# Patient Record
Sex: FEMALE | Race: WHITE | NOT HISPANIC OR LATINO | Employment: FULL TIME | URBAN - METROPOLITAN AREA
[De-identification: names, ages, dates, MRNs, and addresses within clinical notes are randomized per-mention and may not be internally consistent; named-entity substitution may affect disease eponyms.]

---

## 2017-05-25 ENCOUNTER — HISTORICAL (OUTPATIENT)
Dept: ADMINISTRATIVE | Facility: HOSPITAL | Age: 37
End: 2017-05-25

## 2017-06-01 ENCOUNTER — HISTORICAL (OUTPATIENT)
Dept: RADIOLOGY | Facility: HOSPITAL | Age: 37
End: 2017-06-01

## 2017-11-06 ENCOUNTER — HISTORICAL (OUTPATIENT)
Dept: LAB | Facility: HOSPITAL | Age: 37
End: 2017-11-06

## 2017-11-06 LAB
ABS NEUT (OLG): 4.93 X10(3)/MCL (ref 2.1–9.2)
ALBUMIN SERPL-MCNC: 3.9 GM/DL (ref 3.4–5)
ALBUMIN/GLOB SERPL: 1.1 {RATIO}
ALP SERPL-CCNC: 64 UNIT/L (ref 38–126)
ALT SERPL-CCNC: 16 UNIT/L (ref 12–78)
APPEARANCE, UA: ABNORMAL
AST SERPL-CCNC: 6 UNIT/L (ref 15–37)
BACTERIA SPEC CULT: ABNORMAL /HPF
BASOPHILS # BLD AUTO: 0 X10(3)/MCL (ref 0–0.2)
BASOPHILS NFR BLD AUTO: 0 %
BILIRUB SERPL-MCNC: 0.4 MG/DL (ref 0.2–1)
BILIRUB UR QL STRIP: NEGATIVE
BILIRUBIN DIRECT+TOT PNL SERPL-MCNC: 0.1 MG/DL (ref 0–0.2)
BILIRUBIN DIRECT+TOT PNL SERPL-MCNC: 0.3 MG/DL (ref 0–0.8)
BUN SERPL-MCNC: 11 MG/DL (ref 7–18)
CALCIUM SERPL-MCNC: 9.1 MG/DL (ref 8.5–10.1)
CHLORIDE SERPL-SCNC: 106 MMOL/L (ref 98–107)
CHOLEST SERPL-MCNC: 183 MG/DL (ref 0–200)
CHOLEST/HDLC SERPL: 3.2 {RATIO} (ref 0–4)
CO2 SERPL-SCNC: 25 MMOL/L (ref 21–32)
COLOR UR: YELLOW
CREAT SERPL-MCNC: 0.63 MG/DL (ref 0.55–1.02)
EOSINOPHIL # BLD AUTO: 0 X10(3)/MCL (ref 0–0.9)
EOSINOPHIL NFR BLD AUTO: 0 %
ERYTHROCYTE [DISTWIDTH] IN BLOOD BY AUTOMATED COUNT: 12.7 % (ref 11.5–17)
GLOBULIN SER-MCNC: 3.6 GM/DL (ref 2.4–3.5)
GLUCOSE (UA): NEGATIVE
GLUCOSE SERPL-MCNC: 90 MG/DL (ref 74–106)
HCT VFR BLD AUTO: 42.1 % (ref 37–47)
HDLC SERPL-MCNC: 58 MG/DL (ref 35–60)
HGB BLD-MCNC: 13.3 GM/DL (ref 12–16)
HGB UR QL STRIP: NEGATIVE
KETONES UR QL STRIP: NEGATIVE
LDLC SERPL CALC-MCNC: 99 MG/DL (ref 0–129)
LEUKOCYTE ESTERASE UR QL STRIP: NEGATIVE
LYMPHOCYTES # BLD AUTO: 2 X10(3)/MCL (ref 0.6–4.6)
LYMPHOCYTES NFR BLD AUTO: 28 %
MCH RBC QN AUTO: 30.2 PG (ref 27–31)
MCHC RBC AUTO-ENTMCNC: 31.6 GM/DL (ref 33–36)
MCV RBC AUTO: 95.5 FL (ref 80–94)
MONOCYTES # BLD AUTO: 0.4 X10(3)/MCL (ref 0.1–1.3)
MONOCYTES NFR BLD AUTO: 5 %
NEUTROPHILS # BLD AUTO: 4.93 X10(3)/MCL (ref 1.4–7.9)
NEUTROPHILS NFR BLD AUTO: 66 %
NITRITE UR QL STRIP: NEGATIVE
PH UR STRIP: 5 [PH] (ref 5–9)
PLATELET # BLD AUTO: 292 X10(3)/MCL (ref 130–400)
PMV BLD AUTO: 9 FL (ref 9.4–12.4)
POTASSIUM SERPL-SCNC: 4.5 MMOL/L (ref 3.5–5.1)
PROT SERPL-MCNC: 7.5 GM/DL (ref 6.4–8.2)
PROT UR QL STRIP: NEGATIVE
RBC # BLD AUTO: 4.41 X10(6)/MCL (ref 4.2–5.4)
RBC #/AREA URNS HPF: ABNORMAL /[HPF]
SODIUM SERPL-SCNC: 140 MMOL/L (ref 136–145)
SP GR UR STRIP: 1.02 (ref 1–1.03)
SQUAMOUS EPITHELIAL, UA: 13 /HPF (ref 0–4)
TRIGL SERPL-MCNC: 131 MG/DL (ref 30–150)
TSH SERPL-ACNC: 2 MIU/ML (ref 0.36–3.74)
UROBILINOGEN UR STRIP-ACNC: 0.2
VLDLC SERPL CALC-MCNC: 26 MG/DL
WBC # SPEC AUTO: 7.4 X10(3)/MCL (ref 4.5–11.5)
WBC #/AREA URNS HPF: ABNORMAL /[HPF]

## 2017-11-08 LAB — FINAL CULTURE: NORMAL

## 2019-07-15 ENCOUNTER — HISTORICAL (OUTPATIENT)
Dept: LAB | Facility: HOSPITAL | Age: 39
End: 2019-07-15

## 2019-07-15 LAB
APPEARANCE, UA: NORMAL
BACTERIA SPEC CULT: NORMAL /HPF
BILIRUB UR QL STRIP: NEGATIVE
COLOR UR: YELLOW
GLUCOSE (UA): NEGATIVE
HGB UR QL STRIP: NEGATIVE
KETONES UR QL STRIP: NEGATIVE
LEUKOCYTE ESTERASE UR QL STRIP: NEGATIVE
NITRITE UR QL STRIP: NEGATIVE
PH UR STRIP: 5 [PH] (ref 5–9)
PROT UR QL STRIP: NEGATIVE
RBC #/AREA URNS HPF: NORMAL /[HPF]
SP GR UR STRIP: 1.02 (ref 1–1.03)
SQUAMOUS EPITHELIAL, UA: NORMAL
UROBILINOGEN UR STRIP-ACNC: 0.2
WBC #/AREA URNS HPF: NORMAL /[HPF]

## 2022-04-10 ENCOUNTER — HISTORICAL (OUTPATIENT)
Dept: ADMINISTRATIVE | Facility: HOSPITAL | Age: 42
End: 2022-04-10

## 2022-04-11 ENCOUNTER — HISTORICAL (OUTPATIENT)
Dept: ADMINISTRATIVE | Facility: HOSPITAL | Age: 42
End: 2022-04-11

## 2022-04-28 VITALS
OXYGEN SATURATION: 99 % | BODY MASS INDEX: 31.28 KG/M2 | DIASTOLIC BLOOD PRESSURE: 90 MMHG | SYSTOLIC BLOOD PRESSURE: 140 MMHG | HEIGHT: 62 IN | WEIGHT: 170 LBS

## 2022-04-28 VITALS
OXYGEN SATURATION: 99 % | HEIGHT: 62 IN | WEIGHT: 170 LBS | SYSTOLIC BLOOD PRESSURE: 140 MMHG | DIASTOLIC BLOOD PRESSURE: 90 MMHG | BODY MASS INDEX: 31.28 KG/M2

## 2022-05-18 RX ORDER — METHOCARBAMOL 750 MG/1
1500 TABLET, FILM COATED ORAL NIGHTLY
COMMUNITY
Start: 2021-11-23 | End: 2022-05-31 | Stop reason: SDUPTHER

## 2022-05-18 RX ORDER — DEXTROAMPHETAMINE SACCHARATE, AMPHETAMINE ASPARTATE MONOHYDRATE, DEXTROAMPHETAMINE SULFATE AND AMPHETAMINE SULFATE 7.5; 7.5; 7.5; 7.5 MG/1; MG/1; MG/1; MG/1
1 CAPSULE, EXTENDED RELEASE ORAL DAILY
COMMUNITY
Start: 2022-04-06 | End: 2022-05-31 | Stop reason: SDUPTHER

## 2022-05-18 NOTE — TELEPHONE ENCOUNTER
----- Message from Chapincito Crane sent at 5/18/2022  1:36 PM CDT -----  Regarding: Med Refill  Type:  RX Refill Request    Who Called: Patient    Refill or New Rx: refill    RX Name and Strength:Adderall , Robaxin    Preferred Pharmacy with phone number: [facility]

## 2022-05-19 RX ORDER — METHOCARBAMOL 750 MG/1
1500 TABLET, FILM COATED ORAL NIGHTLY
Qty: 30 TABLET | Refills: 0 | OUTPATIENT
Start: 2022-05-19

## 2022-05-19 RX ORDER — DEXTROAMPHETAMINE SACCHARATE, AMPHETAMINE ASPARTATE MONOHYDRATE, DEXTROAMPHETAMINE SULFATE AND AMPHETAMINE SULFATE 7.5; 7.5; 7.5; 7.5 MG/1; MG/1; MG/1; MG/1
30 CAPSULE, EXTENDED RELEASE ORAL DAILY
Qty: 30 CAPSULE | Refills: 2 | OUTPATIENT
Start: 2022-05-19

## 2022-05-23 PROBLEM — J30.2 SEASONAL ALLERGIC RHINITIS: Chronic | Status: ACTIVE | Noted: 2022-05-23

## 2022-05-23 PROBLEM — F32.9 MAJOR DEPRESSIVE DISORDER: Chronic | Status: ACTIVE | Noted: 2022-05-23

## 2022-05-23 PROBLEM — K21.9 GASTROESOPHAGEAL REFLUX DISEASE: Chronic | Status: ACTIVE | Noted: 2022-05-23

## 2022-05-23 PROBLEM — J30.2 SEASONAL ALLERGIC RHINITIS: Chronic | Status: RESOLVED | Noted: 2022-05-23 | Resolved: 2022-05-23

## 2022-05-23 PROBLEM — K21.9 GASTROESOPHAGEAL REFLUX DISEASE: Status: ACTIVE | Noted: 2022-05-23

## 2022-05-23 PROBLEM — F90.0 ATTENTION DEFICIT HYPERACTIVITY DISORDER (ADHD), PREDOMINANTLY INATTENTIVE TYPE: Status: ACTIVE | Noted: 2022-05-23

## 2022-05-23 PROBLEM — I73.00 RAYNAUD'S DISEASE: Status: ACTIVE | Noted: 2022-05-23

## 2022-05-23 PROBLEM — I73.00 RAYNAUD'S DISEASE: Chronic | Status: ACTIVE | Noted: 2022-05-23

## 2022-05-23 PROBLEM — E66.9 OBESITY: Status: ACTIVE | Noted: 2022-05-23

## 2022-05-23 PROBLEM — F90.0 ATTENTION DEFICIT HYPERACTIVITY DISORDER (ADHD), PREDOMINANTLY INATTENTIVE TYPE: Chronic | Status: ACTIVE | Noted: 2022-05-23

## 2022-05-23 PROBLEM — F41.1 GENERALIZED ANXIETY DISORDER: Status: ACTIVE | Noted: 2022-05-23

## 2022-05-23 PROBLEM — E66.9 OBESITY: Chronic | Status: ACTIVE | Noted: 2022-05-23

## 2022-05-23 PROBLEM — F41.1 GENERALIZED ANXIETY DISORDER: Chronic | Status: ACTIVE | Noted: 2022-05-23

## 2022-05-23 PROBLEM — J30.2 SEASONAL ALLERGIC RHINITIS: Status: ACTIVE | Noted: 2022-05-23

## 2022-05-23 PROBLEM — F32.9 MAJOR DEPRESSIVE DISORDER: Status: ACTIVE | Noted: 2022-05-23

## 2022-05-31 ENCOUNTER — OFFICE VISIT (OUTPATIENT)
Dept: FAMILY MEDICINE | Facility: CLINIC | Age: 42
End: 2022-05-31

## 2022-05-31 VITALS
RESPIRATION RATE: 18 BRPM | WEIGHT: 173 LBS | BODY MASS INDEX: 32.66 KG/M2 | OXYGEN SATURATION: 99 % | DIASTOLIC BLOOD PRESSURE: 78 MMHG | HEART RATE: 101 BPM | TEMPERATURE: 98 F | SYSTOLIC BLOOD PRESSURE: 117 MMHG | HEIGHT: 61 IN

## 2022-05-31 DIAGNOSIS — F90.0 ATTENTION DEFICIT HYPERACTIVITY DISORDER (ADHD), PREDOMINANTLY INATTENTIVE TYPE: Chronic | ICD-10-CM

## 2022-05-31 DIAGNOSIS — K21.9 GASTROESOPHAGEAL REFLUX DISEASE WITHOUT ESOPHAGITIS: Chronic | ICD-10-CM

## 2022-05-31 DIAGNOSIS — F32.9 MAJOR DEPRESSIVE DISORDER, REMISSION STATUS UNSPECIFIED, UNSPECIFIED WHETHER RECURRENT: Chronic | ICD-10-CM

## 2022-05-31 DIAGNOSIS — N62 LARGE BREASTS: ICD-10-CM

## 2022-05-31 DIAGNOSIS — Z12.31 BREAST CANCER SCREENING BY MAMMOGRAM: ICD-10-CM

## 2022-05-31 DIAGNOSIS — F41.1 GENERALIZED ANXIETY DISORDER: Chronic | ICD-10-CM

## 2022-05-31 DIAGNOSIS — I73.00 RAYNAUD'S DISEASE WITHOUT GANGRENE: Chronic | ICD-10-CM

## 2022-05-31 DIAGNOSIS — Z00.00 WELL ADULT EXAM: Primary | ICD-10-CM

## 2022-05-31 PROCEDURE — 99213 OFFICE O/P EST LOW 20 MIN: CPT | Mod: ,,, | Performed by: NURSE PRACTITIONER

## 2022-05-31 PROCEDURE — 99213 PR OFFICE/OUTPT VISIT, EST, LEVL III, 20-29 MIN: ICD-10-PCS | Mod: ,,, | Performed by: NURSE PRACTITIONER

## 2022-05-31 RX ORDER — ALBUTEROL SULFATE 90 UG/1
2 AEROSOL, METERED RESPIRATORY (INHALATION) EVERY 4 HOURS PRN
COMMUNITY
Start: 2021-10-14 | End: 2024-03-12

## 2022-05-31 RX ORDER — ALPRAZOLAM 0.5 MG/1
0.5 TABLET ORAL 2 TIMES DAILY
COMMUNITY
Start: 2022-04-06 | End: 2022-08-03 | Stop reason: SDUPTHER

## 2022-05-31 RX ORDER — AMLODIPINE BESYLATE 5 MG/1
5 TABLET ORAL DAILY
COMMUNITY
Start: 2021-11-23 | End: 2022-10-11 | Stop reason: SDUPTHER

## 2022-05-31 RX ORDER — DEXTROAMPHETAMINE SACCHARATE, AMPHETAMINE ASPARTATE MONOHYDRATE, DEXTROAMPHETAMINE SULFATE AND AMPHETAMINE SULFATE 7.5; 7.5; 7.5; 7.5 MG/1; MG/1; MG/1; MG/1
30 CAPSULE, EXTENDED RELEASE ORAL DAILY
Qty: 30 CAPSULE | Refills: 0 | Status: SHIPPED | OUTPATIENT
Start: 2022-05-31 | End: 2022-07-05 | Stop reason: SDUPTHER

## 2022-05-31 RX ORDER — METHOCARBAMOL 750 MG/1
750 TABLET, FILM COATED ORAL NIGHTLY PRN
Qty: 30 TABLET | Refills: 1 | Status: SHIPPED | OUTPATIENT
Start: 2022-05-31 | End: 2022-10-11 | Stop reason: SDUPTHER

## 2022-05-31 NOTE — PROGRESS NOTES
Patient ID: [id number]     Chief Complaint: Medication Refill        HPI:     Fadia Wilks is a 41 y.o. female here today for an annual wellness visit. She has not had bloodwork completed prior to visit as ordered but will do so in the near future. Overall she feels well. Tolerating stimulant well without adverse effects particularly denies any issues with insomnia, decreased appetite, palpitations, mood changes, or weight loss. Feels dose is controlling ADD/ADHD symptoms rather well. She is complaining of neck and back pain due to her large breasts. Interested in breast reduction but does not currently have insurance.  No other complaints today.         Past Medical History:  Gastroesophageal reflux disease  Generalized anxiety disorder  Major depressive disorder  Obesity  Raynaud's disease  Seasonal allergic rhinitis     Past Surgical History:   Procedure Laterality Date    BREAST BIOPSY  06/04/2019    HYSTERECTOMY  06/13/2019       Review of patient's allergies indicates:  No Known Allergies    Outpatient Medications Marked as Taking for the 5/31/22 encounter (Office Visit) with MANDY Sheikh   Medication Sig Dispense Refill    albuterol (PROVENTIL/VENTOLIN HFA) 90 mcg/actuation inhaler Inhale 2 puffs into the lungs every 4 (four) hours as needed.      ALPRAZolam (XANAX) 0.5 MG tablet Take 0.5 mg by mouth 2 (two) times daily.      [DISCONTINUED] dextroamphetamine-amphetamine (ADDERALL XR) 30 MG 24 hr capsule Take 1 capsule by mouth once daily.      [DISCONTINUED] methocarbamoL (ROBAXIN) 750 MG Tab Take 1,500 mg by mouth nightly.         Social History     Socioeconomic History    Marital status: Unknown   Tobacco Use    Smoking status: Never Smoker    Smokeless tobacco: Never Used   Substance and Sexual Activity    Alcohol use: Yes     Alcohol/week: 12.0 standard drinks     Types: 12 Cans of beer per week    Drug use: Never    Sexual activity: Yes        Family History   Problem  "Relation Age of Onset    Coronary artery disease Mother     Heart attack Mother 40    Coronary artery disease Father         Patient Care Team:  MANDY Sheikh as PCP - General (Internal Medicine)  Penny Figueroa MD (Obstetrics and Gynecology)       Subjective:     ROS    See HPI for details    Constitutional: Denies Change in appetite. Denies Chills. Denies Fever. Denies Night sweats.  Eye: Denies Blurred vision. Denies Discharge. Denies Eye pain.  ENT: Denies Decreased hearing. Denies Sore throat. Denies Swollen glands.  Respiratory: Denies Cough. Denies Shortness of breath. Denies Shortness of breath with exertion. Denies Wheezing.  Cardiovascular: DeniesChest pain at rest. Denies Chest pain with exertion. Denies Irregular heartbeat. Denies Palpitations. Denies Edema.  Gastrointestinal: Denies Abdominal pain. DeniesDiarrhea. Denies Nausea. Denies Vomiting. Denies Hematemesis or Hematochezia.  Genitourinary: Denies Dysuria. Denies Urinary frequency. Denies Urinary urgency. Denies Blood in urine.  Endocrine: Denies Cold intolerance. Denies Excessive thirst. Denies Heat intolerance. Denies Weight loss. Denies Weight gain.  Musculoskeletal: Denies Painful joints. Denies Weakness.  Integumentary: Denies Rash. Denies Itching. Denies Dry skin.  Neurologic: Denies Dizziness. Denies Fainting. Denies Headache.  Psychiatric: Denies Depression. Denies Anxiety. Denies Suicidal/Homicidal ideations.    All Other ROS: Negative except as stated in HPI.       Objective:     /78 (BP Location: Right arm, Patient Position: Sitting, BP Method: Large (Manual))   Pulse 101   Temp 98 °F (36.7 °C) (Oral)   Resp 18   Ht 5' 1" (1.549 m)   Wt 78.5 kg (173 lb)   SpO2 99%   BMI 32.69 kg/m²     Physical Exam    General: Alert and oriented, No acute distress.  Head: Normocephalic, Atraumatic.  Eye: Pupils are equal, round and reactive to light, Extraocular movements are intact, Sclera non-icteric.  Ears/Nose/Throat: " Normal, Mucosa moist,Clear.  Neck/Thyroid: Supple, Non-tender, No carotid bruit, No palpable thyromegaly or thyroid nodule, No lymphadenopathy, No JVD, Full range of motion.  Respiratory: Clear to auscultation bilaterally; No wheezes, rales or rhonchi, Non-labored respirations, Symmetrical chest wall expansion.  Cardiovascular: Regular rate and rhythm, S1/S2 normal, No murmurs, rubs or gallops.  Gastrointestinal: Soft, Non-tender, Non-distended, Normal bowel sounds, No palpable organomegaly.  Musculoskeletal: Normal range of motion.  Integumentary: Warm, Dry, Intact, No suspicious lesions or rashes. Large indention bilaterally due to bra straps.   Extremities: No clubbing, cyanosis or edema  Neurologic: No focal deficits, Cranial Nerves II-XII are grossly intact, Motor strength normal upper and lower extremities, Sensory exam intact.  Psychiatric: Normal interaction, Coherent speech, Euthymic mood, Appropriate affect       Assessment:       ICD-10-CM ICD-9-CM   1. Well adult exam  Z00.00 V70.0   2. Attention deficit hyperactivity disorder (ADHD), predominantly inattentive type  F90.0 314.00   3. Generalized anxiety disorder  F41.1 300.02   4. Major depressive disorder, remission status unspecified, unspecified whether recurrent  F32.9 296.20   5. Breast cancer screening by mammogram  Z12.31 V76.12   6. Raynaud's disease without gangrene  I73.00 443.0   7. Gastroesophageal reflux disease without esophagitis  K21.9 530.81   8. Large breasts  N62 611.1        Plan:         Health Maintenance Topics with due status: Not Due       Topic Last Completion Date    TETANUS VACCINE 08/09/2017      1. Well adult exam  Cervical Cancer Screening - s/p hysterectomy 2019. No longer indicated.     Breast Cancer Screening - Last Mammogram in 2019 . Overdue for follow up in 1 year. Reordered.     Colon Cancer Screening - No family history. Will initiate at 43.     Eye Exam - Recommend annually.    Dental Exam - Recommend biannual  exams.     Vaccinations -   Immunization History   Administered Date(s) Administered    Influenza - Trivalent - PF (ADULT) 12/23/2014, 10/25/2017, 09/10/2018, 09/24/2019, 10/13/2020, 10/14/2021    MMR 11/09/2017    Tdap 08/09/2017        - CBC Auto Differential; Future  - Comprehensive Metabolic Panel; Future  - Hemoglobin A1C; Future  - Lipid Panel; Future  - TSH; Future  - Urinalysis, Reflex to Urine Culture Urine, Clean Catch; Future    2. Attention deficit hyperactivity disorder (ADHD), predominantly inattentive type  Continue Adderall 30mg ER daily     3. Generalized anxiety disorder  Well controlled. Continue Xanax 0.5mg BID PRN  Practice deep breathing or abdominal breathing exercises when anxiety occurs.  Exercise daily. Get sunlight daily.  Avoid caffeine, alcohol and stimulants.  Practice positive phrases and repeat throughout the day, yoga, lavender scents or Chamomile tea will help anxiety.  Set healthy boundaries, avoid people and conversations that increase stress.  Reports any symptoms of suicidal or homicidal ideations immediately, if clinic is closed go to nearest emergency room.      4. Major depressive disorder, remission status unspecified, unspecified whether recurrent  Well controlled without psychotropic  Read positive daily meditations, avoid negative media, set healthy boundaries.  Exercise daily, keep consistent sleep pattern, eat a healthy diet.  Establish good social support, make changes to reduce stress.  Reports any symptoms of suicidal/homicidal ideations or self harm immediately, if clinic is closed go to nearest emergency room.      5. Breast cancer screening by mammogram  - Mammo Digital Screening Bilat; Future  - Mammo Digital Screening Bilat    6. Raynaud's disease without gangrene  Continue Amlodipine 5mg daily.     7. Gastroesophageal reflux disease without esophagitis  Diet controlled.   Avoid spicy, acidic, fried foods and alcohol.  Eat 2-3 hours before going to  bed.  Avoid tight clothing, chew food thoroughly.  Reduce caffeine intake, avoid soda.      8. Large Breasts  Size 38 H  Continue Robaxin PRN at HS for neck/back pain.  Will refer to surgeon when she obtains insurance.           Medication List with Changes/Refills   Current Medications    ALBUTEROL (PROVENTIL/VENTOLIN HFA) 90 MCG/ACTUATION INHALER    Inhale 2 puffs into the lungs every 4 (four) hours as needed.       Start Date: 10/14/2021End Date: --    ALPRAZOLAM (XANAX) 0.5 MG TABLET    Take 0.5 mg by mouth 2 (two) times daily.       Start Date: 4/6/2022  End Date: --    AMLODIPINE (NORVASC) 5 MG TABLET    Take 5 mg by mouth Daily.       Start Date: 11/23/2021End Date: --   Changed and/or Refilled Medications    Modified Medication Previous Medication    DEXTROAMPHETAMINE-AMPHETAMINE (ADDERALL XR) 30 MG 24 HR CAPSULE dextroamphetamine-amphetamine (ADDERALL XR) 30 MG 24 hr capsule       Take 1 capsule (30 mg total) by mouth once daily.    Take 1 capsule by mouth once daily.       Start Date: 5/31/2022 End Date: --    Start Date: 4/6/2022  End Date: 5/31/2022    METHOCARBAMOL (ROBAXIN) 750 MG TAB methocarbamoL (ROBAXIN) 750 MG Tab       Take 1 tablet (750 mg total) by mouth nightly as needed (neck pain).    Take 1,500 mg by mouth nightly.       Start Date: 5/31/2022 End Date: --    Start Date: 11/23/2021End Date: 5/31/2022          The patient's Health Maintenance was reviewed and the following appears to be due at this time:   Health Maintenance Due   Topic Date Due    Hepatitis C Screening  Never done    COVID-19 Vaccine (1) Never done    HIV Screening  Never done    Mammogram  Never done         Follow up in about 3 months (around 8/31/2022) for Virtual Visit, ADD Follow Up. In addition to their scheduled follow up, the patient has also been instructed to follow up on as needed basis.

## 2022-07-05 RX ORDER — DEXTROAMPHETAMINE SACCHARATE, AMPHETAMINE ASPARTATE MONOHYDRATE, DEXTROAMPHETAMINE SULFATE AND AMPHETAMINE SULFATE 7.5; 7.5; 7.5; 7.5 MG/1; MG/1; MG/1; MG/1
30 CAPSULE, EXTENDED RELEASE ORAL DAILY
Qty: 30 CAPSULE | Refills: 0 | Status: SHIPPED | OUTPATIENT
Start: 2022-07-05 | End: 2022-08-03 | Stop reason: SDUPTHER

## 2022-07-15 ENCOUNTER — TELEPHONE (OUTPATIENT)
Dept: ADMINISTRATIVE | Facility: HOSPITAL | Age: 42
End: 2022-07-15

## 2022-07-15 NOTE — TELEPHONE ENCOUNTER
Type:  RX Refill Request    Who Called: self  Refill or New Rx:refill  RX Name and Strength:xanax 0.5 mg  How is the patient currently taking it? (ex. 1XDay):1xday  Is this a 30 day or 90 day RX:30day  Preferred Pharmacy [facility]  Local or Mail Order:local  Ordering Provider:ann  Would the patient rather a call back or a response via [facility]? Call back  Best Call Back Number:[phone]  Additional Information:

## 2022-08-03 NOTE — TELEPHONE ENCOUNTER
----- Message from Angel Michel sent at 8/3/2022  1:36 PM CDT -----  .Type:  RX Refill Request    Who Called: Fadia  Refill or New Rx: Refill   RX Name and Strength: Adderoll Extended release 30 mg. and xanax .5 mg. The pharmacy has not received anything on the xanax.  How is the patient currently taking it? (ex. 1XDay):Adderoll 1x day and xanax as needed.   Is this a 30 day or 90 day RX:  Preferred Pharmacy with phone number: [facility] [place]   Local or Mail Order: Local  Ordering Provider: Tiffanie  Would the patient rather a call back or a response via [facility]?   Best Call Back Number: [phone]  Additional Information: Please call her back with any questions.

## 2022-08-04 RX ORDER — ALPRAZOLAM 0.5 MG/1
0.5 TABLET ORAL 2 TIMES DAILY
Qty: 60 TABLET | Refills: 0 | Status: SHIPPED | OUTPATIENT
Start: 2022-08-04

## 2022-08-04 RX ORDER — DEXTROAMPHETAMINE SACCHARATE, AMPHETAMINE ASPARTATE MONOHYDRATE, DEXTROAMPHETAMINE SULFATE AND AMPHETAMINE SULFATE 7.5; 7.5; 7.5; 7.5 MG/1; MG/1; MG/1; MG/1
30 CAPSULE, EXTENDED RELEASE ORAL DAILY
Qty: 30 CAPSULE | Refills: 0 | Status: SHIPPED | OUTPATIENT
Start: 2022-08-04 | End: 2022-09-13 | Stop reason: SDUPTHER

## 2022-09-08 ENCOUNTER — TELEPHONE (OUTPATIENT)
Dept: FAMILY MEDICINE | Facility: CLINIC | Age: 42
End: 2022-09-08

## 2022-09-14 RX ORDER — DEXTROAMPHETAMINE SACCHARATE, AMPHETAMINE ASPARTATE MONOHYDRATE, DEXTROAMPHETAMINE SULFATE AND AMPHETAMINE SULFATE 7.5; 7.5; 7.5; 7.5 MG/1; MG/1; MG/1; MG/1
30 CAPSULE, EXTENDED RELEASE ORAL DAILY
Qty: 30 CAPSULE | Refills: 0 | Status: SHIPPED | OUTPATIENT
Start: 2022-09-14 | End: 2022-10-11 | Stop reason: SDUPTHER

## 2022-10-11 ENCOUNTER — OFFICE VISIT (OUTPATIENT)
Dept: FAMILY MEDICINE | Facility: CLINIC | Age: 42
End: 2022-10-11
Payer: COMMERCIAL

## 2022-10-11 VITALS
HEART RATE: 88 BPM | SYSTOLIC BLOOD PRESSURE: 134 MMHG | TEMPERATURE: 98 F | WEIGHT: 166.38 LBS | DIASTOLIC BLOOD PRESSURE: 91 MMHG | BODY MASS INDEX: 31.41 KG/M2 | OXYGEN SATURATION: 99 % | HEIGHT: 61 IN | RESPIRATION RATE: 16 BRPM

## 2022-10-11 DIAGNOSIS — I73.00 RAYNAUD'S DISEASE WITHOUT GANGRENE: Chronic | ICD-10-CM

## 2022-10-11 DIAGNOSIS — M62.838 NECK MUSCLE SPASM: ICD-10-CM

## 2022-10-11 DIAGNOSIS — R39.9 UTI SYMPTOMS: ICD-10-CM

## 2022-10-11 DIAGNOSIS — T78.40XA ALLERGY, INITIAL ENCOUNTER: ICD-10-CM

## 2022-10-11 DIAGNOSIS — F41.1 GENERALIZED ANXIETY DISORDER: Chronic | ICD-10-CM

## 2022-10-11 DIAGNOSIS — Z11.59 ENCOUNTER FOR HEPATITIS C SCREENING TEST FOR LOW RISK PATIENT: ICD-10-CM

## 2022-10-11 DIAGNOSIS — F90.0 ATTENTION DEFICIT HYPERACTIVITY DISORDER (ADHD), PREDOMINANTLY INATTENTIVE TYPE: Primary | Chronic | ICD-10-CM

## 2022-10-11 DIAGNOSIS — Z11.4 ENCOUNTER FOR SCREENING FOR HIV: ICD-10-CM

## 2022-10-11 PROCEDURE — 1159F PR MEDICATION LIST DOCUMENTED IN MEDICAL RECORD: ICD-10-PCS | Mod: CPTII,,, | Performed by: STUDENT IN AN ORGANIZED HEALTH CARE EDUCATION/TRAINING PROGRAM

## 2022-10-11 PROCEDURE — 3008F BODY MASS INDEX DOCD: CPT | Mod: CPTII,,, | Performed by: STUDENT IN AN ORGANIZED HEALTH CARE EDUCATION/TRAINING PROGRAM

## 2022-10-11 PROCEDURE — 99214 PR OFFICE/OUTPT VISIT, EST, LEVL IV, 30-39 MIN: ICD-10-PCS | Mod: ,,, | Performed by: STUDENT IN AN ORGANIZED HEALTH CARE EDUCATION/TRAINING PROGRAM

## 2022-10-11 PROCEDURE — 3075F SYST BP GE 130 - 139MM HG: CPT | Mod: CPTII,,, | Performed by: STUDENT IN AN ORGANIZED HEALTH CARE EDUCATION/TRAINING PROGRAM

## 2022-10-11 PROCEDURE — 3080F PR MOST RECENT DIASTOLIC BLOOD PRESSURE >= 90 MM HG: ICD-10-PCS | Mod: CPTII,,, | Performed by: STUDENT IN AN ORGANIZED HEALTH CARE EDUCATION/TRAINING PROGRAM

## 2022-10-11 PROCEDURE — 1160F PR REVIEW ALL MEDS BY PRESCRIBER/CLIN PHARMACIST DOCUMENTED: ICD-10-PCS | Mod: CPTII,,, | Performed by: STUDENT IN AN ORGANIZED HEALTH CARE EDUCATION/TRAINING PROGRAM

## 2022-10-11 PROCEDURE — 1160F RVW MEDS BY RX/DR IN RCRD: CPT | Mod: CPTII,,, | Performed by: STUDENT IN AN ORGANIZED HEALTH CARE EDUCATION/TRAINING PROGRAM

## 2022-10-11 PROCEDURE — 3075F PR MOST RECENT SYSTOLIC BLOOD PRESS GE 130-139MM HG: ICD-10-PCS | Mod: CPTII,,, | Performed by: STUDENT IN AN ORGANIZED HEALTH CARE EDUCATION/TRAINING PROGRAM

## 2022-10-11 PROCEDURE — 1159F MED LIST DOCD IN RCRD: CPT | Mod: CPTII,,, | Performed by: STUDENT IN AN ORGANIZED HEALTH CARE EDUCATION/TRAINING PROGRAM

## 2022-10-11 PROCEDURE — 3008F PR BODY MASS INDEX (BMI) DOCUMENTED: ICD-10-PCS | Mod: CPTII,,, | Performed by: STUDENT IN AN ORGANIZED HEALTH CARE EDUCATION/TRAINING PROGRAM

## 2022-10-11 PROCEDURE — 3080F DIAST BP >= 90 MM HG: CPT | Mod: CPTII,,, | Performed by: STUDENT IN AN ORGANIZED HEALTH CARE EDUCATION/TRAINING PROGRAM

## 2022-10-11 PROCEDURE — 99214 OFFICE O/P EST MOD 30 MIN: CPT | Mod: ,,, | Performed by: STUDENT IN AN ORGANIZED HEALTH CARE EDUCATION/TRAINING PROGRAM

## 2022-10-11 RX ORDER — LORATADINE 10 MG/1
10 TABLET ORAL DAILY PRN
Qty: 30 TABLET | Refills: 0 | Status: CANCELLED | OUTPATIENT
Start: 2022-10-11

## 2022-10-11 RX ORDER — LORATADINE 10 MG/1
10 TABLET ORAL NIGHTLY PRN
Qty: 90 TABLET | Refills: 3 | Status: SHIPPED | OUTPATIENT
Start: 2022-10-11 | End: 2024-03-12 | Stop reason: SDUPTHER

## 2022-10-11 RX ORDER — AMLODIPINE BESYLATE 5 MG/1
2.5 TABLET ORAL DAILY
Qty: 90 TABLET | Refills: 3 | Status: SHIPPED | OUTPATIENT
Start: 2022-10-11 | End: 2023-05-11 | Stop reason: SDUPTHER

## 2022-10-11 RX ORDER — LORATADINE 10 MG/1
10 TABLET ORAL DAILY PRN
COMMUNITY
End: 2022-10-11 | Stop reason: SDUPTHER

## 2022-10-11 RX ORDER — DEXTROAMPHETAMINE SACCHARATE, AMPHETAMINE ASPARTATE MONOHYDRATE, DEXTROAMPHETAMINE SULFATE AND AMPHETAMINE SULFATE 7.5; 7.5; 7.5; 7.5 MG/1; MG/1; MG/1; MG/1
30 CAPSULE, EXTENDED RELEASE ORAL DAILY
Qty: 30 CAPSULE | Refills: 0 | Status: SHIPPED | OUTPATIENT
Start: 2022-11-13 | End: 2023-01-24

## 2022-10-11 RX ORDER — METHOCARBAMOL 750 MG/1
750 TABLET, FILM COATED ORAL NIGHTLY PRN
Qty: 30 TABLET | Refills: 1 | Status: SHIPPED | OUTPATIENT
Start: 2022-10-11 | End: 2023-02-02 | Stop reason: SDUPTHER

## 2022-10-11 RX ORDER — DEXTROAMPHETAMINE SACCHARATE, AMPHETAMINE ASPARTATE MONOHYDRATE, DEXTROAMPHETAMINE SULFATE AND AMPHETAMINE SULFATE 7.5; 7.5; 7.5; 7.5 MG/1; MG/1; MG/1; MG/1
30 CAPSULE, EXTENDED RELEASE ORAL DAILY
Qty: 30 CAPSULE | Refills: 0 | Status: SHIPPED | OUTPATIENT
Start: 2022-12-12 | End: 2023-01-24

## 2022-10-11 RX ORDER — DEXTROAMPHETAMINE SACCHARATE, AMPHETAMINE ASPARTATE MONOHYDRATE, DEXTROAMPHETAMINE SULFATE AND AMPHETAMINE SULFATE 7.5; 7.5; 7.5; 7.5 MG/1; MG/1; MG/1; MG/1
30 CAPSULE, EXTENDED RELEASE ORAL DAILY
Qty: 30 CAPSULE | Refills: 0 | Status: SHIPPED | OUTPATIENT
Start: 2022-10-14 | End: 2023-01-24

## 2022-10-11 NOTE — PROGRESS NOTES
[facility] Office Visit Note - ADD Follow-up    [unfilled]    Chief Complaint   Anxiety and ADD meds refill       Disclaimer:  This note is prepared using voice recognition software and as such is likely to have errors despite attempts at proofreading. Please contact me for questions.     Acute-   ADD- Well controlled on Adderall 30 mg XR.     The patient presents today for follow-up and reevaluation of current ADD.  The patient reports good efficacy regarding productivity and concentration. The patient reports 100% compliance with the medication regimen. The patient reports no present or intolerable side effects to the medication regimen. The patient reports no change in primary pharmacy since the last documented visit with our office.     Neck muscle spasm-  Robaxin helps  Posture dependent    Anxiety-   On Prn Xanax  Well controlled    Raynaud's-   Was not taking Amlodipine  Feels fingers are cold and blue    Allergies-  On Claritin  And Prn albuterol    Depression-  Resolved  Denies Si/Hi      Review of Systems     GENERAL:   no significant weight changes, no tics, no insomnia  CARDIAC:  no chest pain, no palpations or SOB  ABD: no n/v, no diarrhea  Psych:  anxiety, medication working adequately    Physical Exam     Vitals:    10/11/22 0806   BP: (!) 134/91   Pulse: 88   Resp: 16   Temp: 98.4 °F (36.9 °C)       GENERAL:  NAD, no significant weight loss  CHEST:  CTA bilaterally  CARDIAC:  RRR no murmurs audible  PSYCH: Appropriate mood, affect normal      Assessment and Plan   1. Attention deficit hyperactivity disorder (ADHD), predominantly inattentive type  -     CBC Auto Differential; Future; Expected date: 10/11/2022  -     Comprehensive Metabolic Panel; Future; Expected date: 10/11/2022  -     Lipid Panel; Future; Expected date: 10/11/2022  -     TSH; Future; Expected date: 10/11/2022  -     Hemoglobin A1C; Future; Expected date: 10/11/2022  -     Drug Screen, Urine  -     dextroamphetamine-amphetamine (ADDERALL  XR) 30 MG 24 hr capsule; Take 1 capsule (30 mg total) by mouth once daily.  Dispense: 30 capsule; Refill: 0  -     dextroamphetamine-amphetamine (ADDERALL XR) 30 MG 24 hr capsule; Take 1 capsule (30 mg total) by mouth once daily.  Dispense: 30 capsule; Refill: 0  -     dextroamphetamine-amphetamine (ADDERALL XR) 30 MG 24 hr capsule; Take 1 capsule (30 mg total) by mouth once daily.  Dispense: 30 capsule; Refill: 0      -Regimen is efficacious/no side effects/no change to medication regimen.  -Continue medication regimen no change  -3 prescriptions e-prescribed the patient with appropriate fill dates  - checked in past and as indicated  -Scheduled drug contract signed  -Medication side effect profile discussed at length    Follow up in 3 months or sooner if needed    No follow-ups on file.     Education and counseling done face to face regarding medical conditions and plan. Contact office if new symptoms develop. Should any symptoms ever significantly worsen seek emergency medical attention/go to ER. Follow up at least yearly for wellness or sooner PRN. Nurse to call patient with any results. The patient is receptive, expresses understanding and is agreeable to plan. All questions have been answered.    2. Generalized anxiety disorder  Continue prn Xanax    3. Raynaud's disease without gangrene  Protect the extremity tips from cold  Start  amLODIPine (NORVASC) 5 MG tablet; Take 0.5 tablets (2.5 mg total) by mouth Daily.  Dispense: 90 tablet; Refill: 3  -     Lipid Panel; Future; Expected date: 10/11/2022  -     TSH; Future; Expected date: 10/11/2022  -     Hemoglobin A1C; Future; Expected date: 10/11/2022    4. Neck muscle spasm  -     methocarbamoL (ROBAXIN) 750 MG Tab; Take 1 tablet (750 mg total) by mouth nightly as needed (neck pain).  Dispense: 30 tablet; Refill: 1       - Maintain a right posture while using computer    5. UTI symptoms  -     Urinalysis, Reflex to Urine Culture Urine, Clean Catch; Future;  Expected date: 10/11/2022  - ED precautions provided    6. Encounter for hepatitis C screening test for low risk patient  -     Hepatitis C Antibody; Future; Expected date: 10/11/2022    7. Encounter for screening for HIV  -     HIV 1/2 Ag/Ab (4th Gen); Future; Expected date: 10/11/2022    8. Allergy, initial encounter  -     loratadine (CLARITIN) 10 mg tablet; Take 1 tablet (10 mg total) by mouth nightly as needed for Allergies.  Dispense: 90 tablet; Refill: 3       Amparo Thomson M.D.    Follow up in 3 months

## 2022-10-18 ENCOUNTER — TELEPHONE (OUTPATIENT)
Dept: FAMILY MEDICINE | Facility: CLINIC | Age: 42
End: 2022-10-18
Payer: COMMERCIAL

## 2022-10-18 NOTE — TELEPHONE ENCOUNTER
----- Message from Alia Manzo sent at 10/18/2022  4:16 PM CDT -----  Regarding: call backl  .Type:  Needs Medical Advice    Who Called: pt   Symptoms (please be specific):    How long has patient had these symptoms:    Pharmacy name and phone #:    Would the patient rather a call back or a response via [facility]?   Best Call Back Number: [phone]  Additional Information: insurance said they cannot approve the request to refill her Adderall. There wasn't enough information to tell if the requirements were met. Please f/u. The case ID # [id number]

## 2022-10-18 NOTE — TELEPHONE ENCOUNTER
Spoke with pharmacy, they ahs wrong insurance. Rx is $9.   Spoke to patient, voiced understanding

## 2022-11-09 LAB
ALBUMIN SERPL-MCNC: 4.4 G/DL (ref 3.8–4.8)
ALBUMIN/GLOB SERPL: 1.7 {RATIO} (ref 1.2–2.2)
ALP SERPL-CCNC: 71 IU/L (ref 44–121)
ALT SERPL-CCNC: 10 IU/L (ref 0–32)
APPEARANCE UR: ABNORMAL
AST SERPL-CCNC: 12 IU/L (ref 0–40)
BACTERIA #/AREA URNS HPF: ABNORMAL /[HPF]
BACTERIA UR CULT: ABNORMAL
BACTERIA UR CULT: ABNORMAL
BASOPHILS # BLD AUTO: 0 X10E3/UL (ref 0–0.2)
BASOPHILS NFR BLD AUTO: 0 %
BILIRUB SERPL-MCNC: 0.4 MG/DL (ref 0–1.2)
BILIRUB UR QL STRIP: NEGATIVE
BUN SERPL-MCNC: 9 MG/DL (ref 6–24)
BUN/CREAT SERPL: 12 (ref 9–23)
CALCIUM SERPL-MCNC: 9.3 MG/DL (ref 8.7–10.2)
CHLORIDE SERPL-SCNC: 101 MMOL/L (ref 96–106)
CHOLEST SERPL-MCNC: 209 MG/DL (ref 100–199)
CO2 SERPL-SCNC: 22 MMOL/L (ref 20–29)
COLOR UR: YELLOW
CREAT SERPL-MCNC: 0.76 MG/DL (ref 0.57–1)
CRYSTALS URNS MICRO: ABNORMAL
EOSINOPHIL # BLD AUTO: 0 X10E3/UL (ref 0–0.4)
EOSINOPHIL NFR BLD AUTO: 0 %
EPI CELLS #/AREA URNS HPF: >10 /HPF (ref 0–10)
ERYTHROCYTE [DISTWIDTH] IN BLOOD BY AUTOMATED COUNT: 12.8 % (ref 11.7–15.4)
EST. AVERAGE GLUCOSE BLD GHB EST-MCNC: 97 MG/DL
EST. GFR  (NO RACE VARIABLE): 100 ML/MIN/1.73
GLOBULIN SER CALC-MCNC: 2.6 G/DL (ref 1.5–4.5)
GLUCOSE SERPL-MCNC: 90 MG/DL (ref 70–99)
GLUCOSE UR QL STRIP: NEGATIVE
HBA1C MFR BLD: 5 % (ref 4.8–5.6)
HCT VFR BLD AUTO: 42.5 % (ref 34–46.6)
HCV AB S/CO SERPL IA: <0.1 S/CO RATIO (ref 0–0.9)
HDLC SERPL-MCNC: 70 MG/DL
HGB BLD-MCNC: 14.1 G/DL (ref 11.1–15.9)
HGB UR QL STRIP: NEGATIVE
HIV 1+2 AB+HIV1 P24 AG SERPL QL IA: NON REACTIVE
IMM GRANULOCYTES NFR BLD AUTO: 0 %
KETONES UR QL STRIP: NEGATIVE
LDLC SERPL CALC-MCNC: 114 MG/DL (ref 0–99)
LEUKOCYTE ESTERASE UR QL STRIP: ABNORMAL
LYMPHOCYTES # BLD AUTO: 1.9 X10E3/UL (ref 0.7–3.1)
LYMPHOCYTES NFR BLD AUTO: 27 %
MCH RBC QN AUTO: 31.4 PG (ref 26.6–33)
MCHC RBC AUTO-ENTMCNC: 33.2 G/DL (ref 31.5–35.7)
MCV RBC AUTO: 95 FL (ref 79–97)
MICRO URNS: ABNORMAL
MONOCYTES # BLD AUTO: 0.4 X10E3/UL (ref 0.1–0.9)
MONOCYTES NFR BLD AUTO: 6 %
NEUTROPHILS # BLD AUTO: 4.5 X10E3/UL (ref 1.4–7)
NEUTROPHILS NFR BLD AUTO: 67 %
NITRITE UR QL STRIP: NEGATIVE
OTHER ANTIBIOTIC SUSC ISLT: ABNORMAL
PH UR STRIP: 5.5 [PH] (ref 5–7.5)
PLATELET # BLD AUTO: 316 X10E3/UL (ref 150–450)
POTASSIUM SERPL-SCNC: 3.9 MMOL/L (ref 3.5–5.2)
PROT SERPL-MCNC: 7 G/DL (ref 6–8.5)
PROT UR QL STRIP: NEGATIVE
RBC # BLD AUTO: 4.49 X10E6/UL (ref 3.77–5.28)
RBC #/AREA URNS HPF: ABNORMAL /HPF (ref 0–2)
SODIUM SERPL-SCNC: 137 MMOL/L (ref 134–144)
SP GR UR STRIP: 1.02 (ref 1–1.03)
TRIGL SERPL-MCNC: 142 MG/DL (ref 0–149)
TSH SERPL DL<=0.005 MIU/L-ACNC: 3.02 UIU/ML (ref 0.45–4.5)
URINALYSIS REFLEX: ABNORMAL
UROBILINOGEN UR STRIP-MCNC: 0.2 MG/DL (ref 0.2–1)
VLDLC SERPL CALC-MCNC: 25 MG/DL (ref 5–40)
WBC # BLD AUTO: 6.9 X10E3/UL (ref 3.4–10.8)
WBC #/AREA URNS HPF: ABNORMAL /HPF (ref 0–5)

## 2022-11-14 ENCOUNTER — TELEPHONE (OUTPATIENT)
Dept: PRIMARY CARE CLINIC | Facility: CLINIC | Age: 42
End: 2022-11-14
Payer: COMMERCIAL

## 2022-11-14 DIAGNOSIS — Z12.39 ENCOUNTER FOR SCREENING FOR MALIGNANT NEOPLASM OF BREAST, UNSPECIFIED SCREENING MODALITY: Primary | ICD-10-CM

## 2022-11-14 NOTE — TELEPHONE ENCOUNTER
----- Message from Angel Michel sent at 11/14/2022 12:40 PM CST -----  .Type:  Needs Medical Advice    Who Called: Fadia  Symptoms (please be specific):    How long has patient had these symptoms:    Pharmacy name and phone #:    Would the patient rather a call back or a response via [facility]?   Best Call Back Number: [phone] please give her a call back to confirm this is done.   Additional Information: She needs her mammograph order sent to [facility] in [place].

## 2022-11-15 ENCOUNTER — TELEPHONE (OUTPATIENT)
Dept: FAMILY MEDICINE | Facility: CLINIC | Age: 42
End: 2022-11-15
Payer: COMMERCIAL

## 2022-11-15 RX ORDER — NITROFURANTOIN 25; 75 MG/1; MG/1
100 CAPSULE ORAL 2 TIMES DAILY
Qty: 14 CAPSULE | Refills: 0 | Status: SHIPPED | OUTPATIENT
Start: 2022-11-15 | End: 2022-11-22

## 2022-11-15 NOTE — TELEPHONE ENCOUNTER
----- Message from MANDY Sheikh sent at 11/15/2022  4:21 PM CST -----  Start Macrobid BID x 7 days. Sent to pharmacy on file.  Complete full course of antibiotics.  In the future, please report symptoms at onset to ensure we appropriately treat.   Report any continuing symptoms after antibiotic completion such as nausea/vomiting, low back pain, blood in urine, burning with urination, or fever/body aches/chills.  Drink plenty of water daily. Avoid soda.  Women wipe front to back, urinate after sexual intercourse, and avoid irritating feminine products.  Urinate frequently. Do not hold urine for extended periods of time.    Wear cotton underwear and avoid tight fitting pants.   Use OTC AZO for relief of urinary spasms.    ----- Message -----  From: Celestino Vallejo MA  Sent: 11/15/2022   4:15 PM CST  To: MANDY Sheikh    Pt says she having a lot of Burning and urgency    ----- Message -----  From: MANDY Sheikh  Sent: 11/15/2022   3:59 PM CST  To: [facility] Christensen Staff    Is she symptomatic? We typically do not treat for under 100,000.       ----- Message -----  From: Tarun Becker MD  Sent: 11/15/2022   2:54 PM CST  To: Amparo Thomson MD      ----- Message -----  From: Celestino Vallejo MA  Sent: 11/15/2022   2:12 PM CST  To: MANDY Sheikh    Pt asked if you want to prescribe her anything for the e.coli in her urine    ----- Message -----  From: MANDY Sheikh  Sent: 11/15/2022   2:04 PM CST  To: Alix Christensen Staff    Please inform patient of lab results, which are all within acceptable ranges.     ----- Message -----  From: Kaelyn Pathak MA  Sent: 11/10/2022  12:20 PM CST  To: Amparo Thomson MD    11/6 labs  ----- Message -----  From: Yehuda Lopez  Sent: 11/10/2022  10:28 AM CST  To: Alix Christensen Staff    .Type:  Test Results    Who Called: pt  Name of Test (Lab/Mammo/Etc): Blood test and Urine Analyst   Date of Test: Monday 11/7  Ordering  Provider:   Where the test was performed: [facility]  Would the patient rather a call back or a response via [facility]? Call back  Best Call Back Number: [phone]  Additional Information:

## 2022-11-15 NOTE — TELEPHONE ENCOUNTER
----- Message from MANDY Sheikh sent at 11/15/2022  3:59 PM CST -----  Is she symptomatic? We typically do not treat for under 100,000.       ----- Message -----  From: Tarun Becker MD  Sent: 11/15/2022   2:54 PM CST  To: Amparo Thomson MD      ----- Message -----  From: Celestino Vallejo MA  Sent: 11/15/2022   2:12 PM CST  To: MANDY Sheikh    Pt asked if you want to prescribe her anything for the e.coli in her urine    ----- Message -----  From: MANDY Sheikh  Sent: 11/15/2022   2:04 PM CST  To: Al[facility] Christensen Staff    Please inform patient of lab results, which are all within acceptable ranges.     ----- Message -----  From: Kaelyn Pathak MA  Sent: 11/10/2022  12:20 PM CST  To: Amparo Thomson MD    11/6 labs  ----- Message -----  From: Yehuda Lopez  Sent: 11/10/2022  10:28 AM CST  To: Alix Christensen Staff    .Type:  Test Results    Who Called: pt  Name of Test (Lab/Mammo/Etc): Blood test and Urine Analyst   Date of Test: Monday 11/7  Ordering Provider:   Where the test was performed: [facility]  Would the patient rather a call back or a response via [facility]? Call back  Best Call Back Number: [phone]  Additional Information:

## 2022-11-15 NOTE — TELEPHONE ENCOUNTER
----- Message from MANDY Sheikh sent at 11/15/2022  2:03 PM CST -----  Please inform patient of lab results, which are all within acceptable ranges.     ----- Message -----  From: Kaelyn Pathak MA  Sent: 11/10/2022  12:20 PM CST  To: Amparo Thomson MD    11/6 labs  ----- Message -----  From: Yehuda Lopez  Sent: 11/10/2022  10:28 AM CST  To: Alix Christensen Staff    .Type:  Test Results    Who Called: pt  Name of Test (Lab/Mammo/Etc): Blood test and Urine Analyst   Date of Test: Monday 11/7  Ordering Provider:   Where the test was performed: [facility]  Would the patient rather a call back or a response via [facility]? Call back  Best Call Back Number: [phone]  Additional Information:

## 2022-11-18 ENCOUNTER — TELEPHONE (OUTPATIENT)
Dept: PRIMARY CARE CLINIC | Facility: CLINIC | Age: 42
End: 2022-11-18
Payer: COMMERCIAL

## 2022-11-18 NOTE — TELEPHONE ENCOUNTER
----- Message from Yehuda Lopez sent at 11/18/2022  9:11 AM CST -----    .Type:  Needs Medical Advice    Who Called: pt  Pharmacy name and phone #:    Would the patient rather a call back or a response via [facility]? Call back  Best Call Back Number: [phone]   Additional Information: pt called to discuss prescription with nurse due to a misunderstanding and needing clarification dextroamphetamine-amphetamine (ADDERALL XR) 30 MG 24 hr capsule

## 2022-11-23 DIAGNOSIS — F90.0 ATTENTION DEFICIT HYPERACTIVITY DISORDER (ADHD), PREDOMINANTLY INATTENTIVE TYPE: Chronic | ICD-10-CM

## 2022-11-23 RX ORDER — DEXTROAMPHETAMINE SACCHARATE, AMPHETAMINE ASPARTATE MONOHYDRATE, DEXTROAMPHETAMINE SULFATE AND AMPHETAMINE SULFATE 7.5; 7.5; 7.5; 7.5 MG/1; MG/1; MG/1; MG/1
30 CAPSULE, EXTENDED RELEASE ORAL DAILY
Qty: 30 CAPSULE | Refills: 0 | OUTPATIENT
Start: 2022-12-12

## 2023-01-09 ENCOUNTER — PATIENT MESSAGE (OUTPATIENT)
Dept: FAMILY MEDICINE | Facility: CLINIC | Age: 43
End: 2023-01-09

## 2023-01-24 DIAGNOSIS — F90.0 ATTENTION DEFICIT HYPERACTIVITY DISORDER (ADHD), PREDOMINANTLY INATTENTIVE TYPE: Chronic | ICD-10-CM

## 2023-01-24 RX ORDER — DEXTROAMPHETAMINE SACCHARATE, AMPHETAMINE ASPARTATE MONOHYDRATE, DEXTROAMPHETAMINE SULFATE AND AMPHETAMINE SULFATE 7.5; 7.5; 7.5; 7.5 MG/1; MG/1; MG/1; MG/1
30 CAPSULE, EXTENDED RELEASE ORAL DAILY
Qty: 30 CAPSULE | Refills: 0 | Status: SHIPPED | OUTPATIENT
Start: 2023-01-24 | End: 2023-02-02 | Stop reason: SDUPTHER

## 2023-01-24 NOTE — TELEPHONE ENCOUNTER
----- Message from Kasey José MA sent at 1/24/2023  9:41 AM CST -----  Regarding: refill  .Type:  RX Refill Request    Who Called:  pt    Refill or New Rx: dextroamphetamine-amphetamine (ADDERALL XR) 30 MG 24 hr capsule    How is the patient currently taking it? (ex. 1XDay): 1 day      Preferred Pharmacy with phone number: [facility] in [place]    Local or Mail Order: local    Ordering Provider: edward Murphy Call Back Number: [phone]    Additional Information:  pt has an appt on 1-30-23 she will be out of meds before appt meds is enough to last until Friday and her appt is after

## 2023-02-02 ENCOUNTER — TELEPHONE (OUTPATIENT)
Dept: FAMILY MEDICINE | Facility: CLINIC | Age: 43
End: 2023-02-02

## 2023-02-02 ENCOUNTER — OFFICE VISIT (OUTPATIENT)
Dept: FAMILY MEDICINE | Facility: CLINIC | Age: 43
End: 2023-02-02
Payer: COMMERCIAL

## 2023-02-02 VITALS
HEIGHT: 61 IN | BODY MASS INDEX: 33.23 KG/M2 | SYSTOLIC BLOOD PRESSURE: 120 MMHG | RESPIRATION RATE: 17 BRPM | DIASTOLIC BLOOD PRESSURE: 70 MMHG | HEART RATE: 87 BPM | WEIGHT: 176 LBS | OXYGEN SATURATION: 99 % | TEMPERATURE: 98 F

## 2023-02-02 DIAGNOSIS — M62.838 NECK MUSCLE SPASM: ICD-10-CM

## 2023-02-02 DIAGNOSIS — F90.0 ATTENTION DEFICIT HYPERACTIVITY DISORDER (ADHD), PREDOMINANTLY INATTENTIVE TYPE: Chronic | ICD-10-CM

## 2023-02-02 DIAGNOSIS — Z00.00 WELL ADULT HEALTH CHECK: ICD-10-CM

## 2023-02-02 DIAGNOSIS — N39.0 URINARY TRACT INFECTION WITHOUT HEMATURIA, SITE UNSPECIFIED: ICD-10-CM

## 2023-02-02 DIAGNOSIS — Z12.11 ENCOUNTER FOR SCREENING FOR MALIGNANT NEOPLASM OF COLON: ICD-10-CM

## 2023-02-02 DIAGNOSIS — Z00.00 WELLNESS EXAMINATION: Primary | ICD-10-CM

## 2023-02-02 LAB
AMPHET UR QL SCN: POSITIVE
APPEARANCE UR: ABNORMAL
BACTERIA #/AREA URNS AUTO: ABNORMAL /HPF
BARBITURATE SCN PRESENT UR: NEGATIVE
BENZODIAZ UR QL SCN: POSITIVE
BILIRUB UR QL STRIP.AUTO: NEGATIVE MG/DL
CANNABINOIDS UR QL SCN: POSITIVE
COCAINE UR QL SCN: NEGATIVE
COLOR UR AUTO: YELLOW
FENTANYL UR QL SCN: NEGATIVE
GLUCOSE UR QL STRIP.AUTO: NEGATIVE MG/DL
KETONES UR QL STRIP.AUTO: ABNORMAL MG/DL
LEUKOCYTE ESTERASE UR QL STRIP.AUTO: NEGATIVE UNIT/L
MDMA UR QL SCN: NEGATIVE
NITRITE UR QL STRIP.AUTO: NEGATIVE
OPIATES UR QL SCN: NEGATIVE
PCP UR QL: NEGATIVE
PH UR STRIP.AUTO: 6 [PH]
PH UR: 6 [PH] (ref 3–11)
PROT UR QL STRIP.AUTO: NEGATIVE MG/DL
RBC #/AREA URNS AUTO: <5 /HPF
RBC UR QL AUTO: NEGATIVE UNIT/L
SP GR UR STRIP.AUTO: 1.03 (ref 1–1.03)
SPECIFIC GRAVITY, URINE AUTO (.000) (OHS): 1.03 (ref 1–1.03)
SQUAMOUS #/AREA URNS AUTO: <5 /HPF
UROBILINOGEN UR STRIP-ACNC: 1 MG/DL
WBC #/AREA URNS AUTO: 6 /HPF

## 2023-02-02 PROCEDURE — 3008F BODY MASS INDEX DOCD: CPT | Mod: CPTII,,, | Performed by: STUDENT IN AN ORGANIZED HEALTH CARE EDUCATION/TRAINING PROGRAM

## 2023-02-02 PROCEDURE — 99214 PR OFFICE/OUTPT VISIT, EST, LEVL IV, 30-39 MIN: ICD-10-PCS | Mod: ,,, | Performed by: STUDENT IN AN ORGANIZED HEALTH CARE EDUCATION/TRAINING PROGRAM

## 2023-02-02 PROCEDURE — 99396 PR PREVENTIVE VISIT,EST,40-64: ICD-10-PCS | Mod: 25,,, | Performed by: STUDENT IN AN ORGANIZED HEALTH CARE EDUCATION/TRAINING PROGRAM

## 2023-02-02 PROCEDURE — 1159F PR MEDICATION LIST DOCUMENTED IN MEDICAL RECORD: ICD-10-PCS | Mod: CPTII,,, | Performed by: STUDENT IN AN ORGANIZED HEALTH CARE EDUCATION/TRAINING PROGRAM

## 2023-02-02 PROCEDURE — 3078F DIAST BP <80 MM HG: CPT | Mod: CPTII,,, | Performed by: STUDENT IN AN ORGANIZED HEALTH CARE EDUCATION/TRAINING PROGRAM

## 2023-02-02 PROCEDURE — 3078F PR MOST RECENT DIASTOLIC BLOOD PRESSURE < 80 MM HG: ICD-10-PCS | Mod: CPTII,,, | Performed by: STUDENT IN AN ORGANIZED HEALTH CARE EDUCATION/TRAINING PROGRAM

## 2023-02-02 PROCEDURE — 1160F PR REVIEW ALL MEDS BY PRESCRIBER/CLIN PHARMACIST DOCUMENTED: ICD-10-PCS | Mod: CPTII,,, | Performed by: STUDENT IN AN ORGANIZED HEALTH CARE EDUCATION/TRAINING PROGRAM

## 2023-02-02 PROCEDURE — 3074F SYST BP LT 130 MM HG: CPT | Mod: CPTII,,, | Performed by: STUDENT IN AN ORGANIZED HEALTH CARE EDUCATION/TRAINING PROGRAM

## 2023-02-02 PROCEDURE — 1159F MED LIST DOCD IN RCRD: CPT | Mod: CPTII,,, | Performed by: STUDENT IN AN ORGANIZED HEALTH CARE EDUCATION/TRAINING PROGRAM

## 2023-02-02 PROCEDURE — 99396 PREV VISIT EST AGE 40-64: CPT | Mod: 25,,, | Performed by: STUDENT IN AN ORGANIZED HEALTH CARE EDUCATION/TRAINING PROGRAM

## 2023-02-02 PROCEDURE — 1160F RVW MEDS BY RX/DR IN RCRD: CPT | Mod: CPTII,,, | Performed by: STUDENT IN AN ORGANIZED HEALTH CARE EDUCATION/TRAINING PROGRAM

## 2023-02-02 PROCEDURE — 99214 OFFICE O/P EST MOD 30 MIN: CPT | Mod: ,,, | Performed by: STUDENT IN AN ORGANIZED HEALTH CARE EDUCATION/TRAINING PROGRAM

## 2023-02-02 PROCEDURE — 3074F PR MOST RECENT SYSTOLIC BLOOD PRESSURE < 130 MM HG: ICD-10-PCS | Mod: CPTII,,, | Performed by: STUDENT IN AN ORGANIZED HEALTH CARE EDUCATION/TRAINING PROGRAM

## 2023-02-02 PROCEDURE — 3008F PR BODY MASS INDEX (BMI) DOCUMENTED: ICD-10-PCS | Mod: CPTII,,, | Performed by: STUDENT IN AN ORGANIZED HEALTH CARE EDUCATION/TRAINING PROGRAM

## 2023-02-02 RX ORDER — DEXTROAMPHETAMINE SACCHARATE, AMPHETAMINE ASPARTATE MONOHYDRATE, DEXTROAMPHETAMINE SULFATE AND AMPHETAMINE SULFATE 7.5; 7.5; 7.5; 7.5 MG/1; MG/1; MG/1; MG/1
30 CAPSULE, EXTENDED RELEASE ORAL EVERY MORNING
Qty: 30 CAPSULE | Refills: 0 | Status: SHIPPED | OUTPATIENT
Start: 2023-02-02 | End: 2023-03-06 | Stop reason: SDUPTHER

## 2023-02-02 RX ORDER — SULFAMETHOXAZOLE AND TRIMETHOPRIM 800; 160 MG/1; MG/1
1 TABLET ORAL 2 TIMES DAILY
Qty: 14 TABLET | Refills: 0 | Status: SHIPPED | OUTPATIENT
Start: 2023-02-02 | End: 2023-02-09

## 2023-02-02 RX ORDER — METHOCARBAMOL 500 MG/1
500 TABLET, FILM COATED ORAL 4 TIMES DAILY
Qty: 40 TABLET | Refills: 0 | Status: SHIPPED | OUTPATIENT
Start: 2023-02-02 | End: 2023-02-12

## 2023-02-02 NOTE — PROGRESS NOTES
Patient ID: [id number]     Chief Complaint: Annual Exam        HPI:      Disclaimer:  This note is prepared using voice recognition software and as such is likely to have errors despite attempts at proofreading. Please contact me for questions.    Fadia Wilks is a 42 y.o. female here today for an annual wellness visit.      Acute Issues-  UTI Sx-  Dysuria and Frequency  Denies Supra pubic and Back pain    Chronic     Attention deficit hyperactivity disorder (ADHD), predominantly   Well controlled on Adderall 30 mg XR.  The patient presents today for follow-up and reevaluation of current ADD.  The patient reports good efficacy regarding productivity and concentration. The patient reports 100% compliance with the medication regimen. The patient reports no present or intolerable side effects to the medication regimen. The patient reports no change in primary pharmacy since the last documented visit with our office.   Neck muscle spasm-  Robaxin helps  Posture dependent     Anxiety-   On Prn Xanax  Well controlled     Raynaud's-   Was not taking Amlodipine  Feels fingers are cold and blue     Allergies-  On Claritin  And Prn albuterol     Depression-  Resolved  Denies Si/Hi    Past Surgical History:   Procedure Laterality Date    BREAST BIOPSY  06/04/2019    HYSTERECTOMY  06/13/2019       Review of patient's allergies indicates:  No Known Allergies    Outpatient Medications Marked as Taking for the 2/2/23 encounter (Office Visit) with Amparo Thomson MD   Medication Sig Dispense Refill    ALPRAZolam (XANAX) 0.5 MG tablet Take 1 tablet (0.5 mg total) by mouth 2 (two) times daily. 60 tablet 0    dextroamphetamine-amphetamine (ADDERALL XR) 30 MG 24 hr capsule Take 1 capsule (30 mg total) by mouth once daily. 30 capsule 0    loratadine (CLARITIN) 10 mg tablet Take 1 tablet (10 mg total) by mouth nightly as needed for Allergies. 90 tablet 3    methocarbamoL (ROBAXIN) 750 MG Tab Take 1 tablet (750 mg total) by mouth  nightly as needed (neck pain). 30 tablet 1       Social History     Socioeconomic History    Marital status: Unknown   Tobacco Use    Smoking status: Never     Passive exposure: Never    Smokeless tobacco: Never   Substance and Sexual Activity    Alcohol use: Yes     Alcohol/week: 10.0 standard drinks     Types: 10 Glasses of wine per week     Comment: Socially or to unwind    Drug use: Never    Sexual activity: Yes     Partners: Male     Birth control/protection: See Surgical Hx        Family History   Problem Relation Age of Onset    Coronary artery disease Mother     Heart attack Mother 40    COPD Mother     Depression Mother     Diabetes Mother     Heart disease Mother     Hyperlipidemia Mother     Mental illness Mother         Bipolar    Coronary artery disease Father     COPD Father     Heart disease Father     Hyperlipidemia Father     Hypertension Father     Kidney disease Father     Cancer Maternal Aunt     Depression Brother     Mental illness Brother     Depression Sister     Depression Brother     Diabetes Brother     Early death Brother         Covid age 45    Diabetes Brother     Drug abuse Sister         Patient Care Team:  Amparo Thomson MD as PCP - General (Family Medicine)  Amparo Thomson MD as PCP - Family Medicine (Family Medicine)  Penny Figueroa MD (Obstetrics and Gynecology)       Subjective:     ROS    See HPI for details    Constitutional: Denies Change in appetite. Denies Chills. Denies Fever. Denies Night sweats.  Eye: Denies Blurred vision. Denies Discharge. Denies Eye pain.  ENT: Denies Decreased hearing. Denies Sore throat. Denies Swollen glands.  Respiratory: Denies Cough. Denies Shortness of breath. Denies Shortness of breath with exertion. Denies Wheezing.  Cardiovascular: DeniesChest pain at rest. Denies Chest pain with exertion. Denies Irregular heartbeat. Denies Palpitations. Denies Edema.  Gastrointestinal: Denies Abdominal pain. DeniesDiarrhea. Denies Nausea. Denies  "Vomiting. Denies Hematemesis or Hematochezia.  Genitourinary: Denies Dysuria. Denies Urinary frequency. Denies Urinary urgency. Denies Blood in urine.  Endocrine: Denies Cold intolerance. Denies Excessive thirst. Denies Heat intolerance. Denies Weight loss. Denies Weight gain.  Musculoskeletal: Denies Painful joints. Denies Weakness.  Integumentary: Denies Rash. Denies Itching. Denies Dry skin.  Neurologic: Denies Dizziness. Denies Fainting. Denies Headache.  Psychiatric: Denies Depression. Denies Anxiety. Denies Suicidal/Homicidal ideations.    All Other ROS: Negative except as stated in HPI.       Objective:     /70 (BP Location: Right arm, Patient Position: Sitting, BP Method: Large (Manual))   Pulse 87   Temp 98.2 °F (36.8 °C) (Oral)   Resp 17   Ht 5' 1" (1.549 m)   Wt (!) 176.3 kg (388 lb 10.7 oz)   SpO2 99%   BMI 73.44 kg/m²     Physical Exam    General: Alert and oriented, No acute distress.  Head: Normocephalic, Atraumatic.  Eye: Pupils are equal, round and reactive to light, Extraocular movements are intact, Sclera non-icteric.  Ears/Nose/Throat: Normal, Mucosa moist,Clear.  Neck/Thyroid: Supple, Non-tender, No carotid bruit, No palpable thyromegaly or thyroid nodule, No lymphadenopathy, No JVD, Full range of motion.  Respiratory: Clear to auscultation bilaterally; No wheezes, rales or rhonchi, Non-labored respirations, Symmetrical chest wall expansion.  Cardiovascular: Regular rate and rhythm, S1/S2 normal, No murmurs, rubs or gallops.  Gastrointestinal: Soft, Non-tender, Non-distended, Normal bowel sounds, No palpable organomegaly.  Musculoskeletal: Normal range of motion.  Integumentary: Warm, Dry, Intact, No suspicious lesions or rashes.  Extremities: No clubbing, cyanosis or edema  Neurologic: No focal deficits, Cranial Nerves II-XII are grossly intact, Motor strength normal upper and lower extremities, Sensory exam intact.  Psychiatric: Normal interaction, Coherent speech, Euthymic mood, " Appropriate affect       Assessment:       ICD-10-CM ICD-9-CM   1. Wellness examination  Z00.00 V70.0   2. Attention deficit disorder, unspecified hyperactivity presence  F98.8 314.00   3. Urinary tract infection without hematuria, site unspecified  N39.0 599.0   4. Neck muscle spasm  M62.838 728.85   5. Well adult health check  Z00.00 V70.0        Plan:         Health Maintenance Topics with due status: Not Due       Topic Last Completion Date    TETANUS VACCINE 08/09/2017    Hemoglobin A1c (Diabetic Prevention Screening) 11/07/2022        Vaccinations -   Immunization History   Administered Date(s) Administered    Influenza - Trivalent - PF (ADULT) 12/23/2014, 10/25/2017, 09/10/2018, 09/24/2019, 10/13/2020, 10/14/2021    MMR 11/09/2017    Tdap 08/09/2017     1. Wellness examination  Cervical Cancer Screening - Last Pap in 2019. S/p total hysterectomy Dr. James  Breast Cancer Screening - Last Mammogram 1 month ago at [facility] will need the records. Will need records From [facility] office  Colon Cancer Screening - Colonoscopy on to be done after 45 years. The orders are in the computer for colo guard after 45  Eye Exam - Recommend annually.  Dental Exam - Recommend biannual exams.     2. Urinary tract infection without hematuria, site unspecified  - Urine culture; Future  - Urinalysis  - sulfamethoxazole-trimethoprim 800-160mg (BACTRIM DS) 800-160 mg Tab; Take 1 tablet by mouth 2 (two) times daily. for 7 days  Dispense: 14 tablet; Refill: 0    3. Attention deficit hyperactivity disorder (ADHD), predominantly inattentive type  Need to complete UDS and Ekg before the next visit  - Drug Screen, Urine  - dextroamphetamine-amphetamine (ADDERALL XR) 30 MG 24 hr capsule; Take 1 capsule (30 mg total) by mouth every morning.  Dispense: 30 capsule; Refill: 0    4. Neck muscle spasm  - methocarbamoL (ROBAXIN) 500 MG Tab; Take 1 tablet (500 mg total) by mouth 4 (four) times daily. for 10 days  Dispense: 40 tablet; Refill: 0    5.  Well adult health check  - CBC Auto Differential; Future  - Comprehensive Metabolic Panel; Future  - Lipid Panel; Future  - TSH; Future  - Urinalysis; Future  - HIV 1/2 Ag/Ab (4th Gen); Future  - Urinalysis    6. Encounter for screening for malignant neoplasm of colon  - Cologuard Screening (Multitarget Stool DNA); Future  - Cologuard Screening (Multitarget Stool DNA)        Medication List with Changes/Refills   Current Medications    ALBUTEROL (PROVENTIL/VENTOLIN HFA) 90 MCG/ACTUATION INHALER    Inhale 2 puffs into the lungs every 4 (four) hours as needed.       Start Date: 10/14/2021End Date: --    ALPRAZOLAM (XANAX) 0.5 MG TABLET    Take 1 tablet (0.5 mg total) by mouth 2 (two) times daily.       Start Date: 8/4/2022  End Date: --    AMLODIPINE (NORVASC) 5 MG TABLET    Take 0.5 tablets (2.5 mg total) by mouth Daily.       Start Date: 10/11/2022End Date: --    DEXTROAMPHETAMINE-AMPHETAMINE (ADDERALL XR) 30 MG 24 HR CAPSULE    Take 1 capsule (30 mg total) by mouth once daily.       Start Date: 1/24/2023 End Date: --    LORATADINE (CLARITIN) 10 MG TABLET    Take 1 tablet (10 mg total) by mouth nightly as needed for Allergies.       Start Date: 10/11/2022End Date: --    METHOCARBAMOL (ROBAXIN) 750 MG TAB    Take 1 tablet (750 mg total) by mouth nightly as needed (neck pain).       Start Date: 10/11/2022End Date: --          The patient's Health Maintenance was reviewed and the following appears to be due at this time:   Health Maintenance Due   Topic Date Due    COVID-19 Vaccine (1) Never done    Mammogram  Never done    Influenza Vaccine (1) 09/01/2022         Follow up for ADD Follow Up videovisit. 1 year annual . In addition to their scheduled follow up, the patient has also been instructed to follow up on as needed basis.

## 2023-02-02 NOTE — TELEPHONE ENCOUNTER
----- Message from [facility] sent at 2/2/2023 11:43 AM CST -----  Regarding: weight wrong  .Type:  Needs Medical Advice    Who Called:  pt    Best Call Back Number:  [phone]    Additional Information:  she just left the clinic and she noticed her weight was marked wrong they put 388lbs and it should 176.2lbs she also left her urine in the window

## 2023-02-03 NOTE — PROGRESS NOTES
Please inform patient of lab results.      UTI  Continue hydration and Abx as prescribed. Waiing Cx and sensitivity      UDS-   Positive   Amphetamines, Urine- because of Adderall  Benzodiazepine- because of Xanax  But also Positive for Cannabinoids- Please avoid mrijauna if taking         Thanks,    Dr. Thomson

## 2023-02-06 ENCOUNTER — DOCUMENTATION ONLY (OUTPATIENT)
Dept: FAMILY MEDICINE | Facility: CLINIC | Age: 43
End: 2023-02-06
Payer: COMMERCIAL

## 2023-02-06 ENCOUNTER — TELEPHONE (OUTPATIENT)
Dept: FAMILY MEDICINE | Facility: CLINIC | Age: 43
End: 2023-02-06
Payer: COMMERCIAL

## 2023-02-06 LAB — BCS RECOMMENDATION EXT: NORMAL

## 2023-02-06 NOTE — TELEPHONE ENCOUNTER
----- Message from Amparo Thomson MD sent at 2/3/2023 12:31 PM CST -----  Please inform patient of lab results.      UTI  Continue hydration and Abx as prescribed. Waiing Cx and sensitivity      UDS-   Positive   Amphetamines, Urine- because of Adderall  Benzodiazepine- because of Xanax  But also Positive for Cannabinoids- Please avoid mrijauna if taking         Thanks,    Dr. Thomson

## 2023-02-06 NOTE — TELEPHONE ENCOUNTER
PA for adderall has been denied. Pt would to know what else she can take.   Please advise. Thanks.

## 2023-02-07 ENCOUNTER — TELEPHONE (OUTPATIENT)
Dept: FAMILY MEDICINE | Facility: CLINIC | Age: 43
End: 2023-02-07
Payer: COMMERCIAL

## 2023-02-07 NOTE — TELEPHONE ENCOUNTER
----- Message from Amparo Thomson MD sent at 2/6/2023  5:22 PM CST -----  Normal MMG. Repeat in 1 year.

## 2023-02-09 ENCOUNTER — TELEPHONE (OUTPATIENT)
Dept: FAMILY MEDICINE | Facility: CLINIC | Age: 43
End: 2023-02-09
Payer: COMMERCIAL

## 2023-02-09 NOTE — TELEPHONE ENCOUNTER
NA, Detailed message left explaining that when records authorization for papsmear record (per Dr. Thomson request) was faxed, fax was sent back saying facility did not see the patient. Let her know to return call back to clinic and let us know at her earliest convenience.

## 2023-02-13 ENCOUNTER — OFFICE VISIT (OUTPATIENT)
Dept: FAMILY MEDICINE | Facility: CLINIC | Age: 43
End: 2023-02-13
Payer: COMMERCIAL

## 2023-02-13 ENCOUNTER — PATIENT MESSAGE (OUTPATIENT)
Dept: FAMILY MEDICINE | Facility: CLINIC | Age: 43
End: 2023-02-13

## 2023-02-13 VITALS — BODY MASS INDEX: 33.23 KG/M2 | HEIGHT: 61 IN | WEIGHT: 176 LBS

## 2023-02-13 DIAGNOSIS — R68.89 FLU-LIKE SYMPTOMS: ICD-10-CM

## 2023-02-13 DIAGNOSIS — H66.90 OTITIS MEDIA, UNSPECIFIED LATERALITY, UNSPECIFIED OTITIS MEDIA TYPE: Primary | ICD-10-CM

## 2023-02-13 DIAGNOSIS — T78.40XA ALLERGY, INITIAL ENCOUNTER: ICD-10-CM

## 2023-02-13 PROCEDURE — 3008F PR BODY MASS INDEX (BMI) DOCUMENTED: ICD-10-PCS | Mod: CPTII,95,, | Performed by: STUDENT IN AN ORGANIZED HEALTH CARE EDUCATION/TRAINING PROGRAM

## 2023-02-13 PROCEDURE — 99214 OFFICE O/P EST MOD 30 MIN: CPT | Mod: 95,,, | Performed by: STUDENT IN AN ORGANIZED HEALTH CARE EDUCATION/TRAINING PROGRAM

## 2023-02-13 PROCEDURE — 1160F RVW MEDS BY RX/DR IN RCRD: CPT | Mod: CPTII,95,, | Performed by: STUDENT IN AN ORGANIZED HEALTH CARE EDUCATION/TRAINING PROGRAM

## 2023-02-13 PROCEDURE — 3008F BODY MASS INDEX DOCD: CPT | Mod: CPTII,95,, | Performed by: STUDENT IN AN ORGANIZED HEALTH CARE EDUCATION/TRAINING PROGRAM

## 2023-02-13 PROCEDURE — 99214 PR OFFICE/OUTPT VISIT, EST, LEVL IV, 30-39 MIN: ICD-10-PCS | Mod: 95,,, | Performed by: STUDENT IN AN ORGANIZED HEALTH CARE EDUCATION/TRAINING PROGRAM

## 2023-02-13 PROCEDURE — 1159F PR MEDICATION LIST DOCUMENTED IN MEDICAL RECORD: ICD-10-PCS | Mod: CPTII,95,, | Performed by: STUDENT IN AN ORGANIZED HEALTH CARE EDUCATION/TRAINING PROGRAM

## 2023-02-13 PROCEDURE — 1160F PR REVIEW ALL MEDS BY PRESCRIBER/CLIN PHARMACIST DOCUMENTED: ICD-10-PCS | Mod: CPTII,95,, | Performed by: STUDENT IN AN ORGANIZED HEALTH CARE EDUCATION/TRAINING PROGRAM

## 2023-02-13 PROCEDURE — 1159F MED LIST DOCD IN RCRD: CPT | Mod: CPTII,95,, | Performed by: STUDENT IN AN ORGANIZED HEALTH CARE EDUCATION/TRAINING PROGRAM

## 2023-02-13 RX ORDER — AMOXICILLIN AND CLAVULANATE POTASSIUM 875; 125 MG/1; MG/1
1 TABLET, FILM COATED ORAL EVERY 12 HOURS
Qty: 14 TABLET | Refills: 0 | Status: SHIPPED | OUTPATIENT
Start: 2023-02-13 | End: 2023-02-20

## 2023-02-13 RX ORDER — FLUTICASONE PROPIONATE 50 MCG
2 SPRAY, SUSPENSION (ML) NASAL DAILY
Qty: 16 G | Refills: 2 | Status: SHIPPED | OUTPATIENT
Start: 2023-02-13 | End: 2023-03-15

## 2023-02-13 RX ORDER — CETIRIZINE HYDROCHLORIDE 10 MG/1
10 TABLET ORAL DAILY
Qty: 30 TABLET | Refills: 0 | Status: SHIPPED | OUTPATIENT
Start: 2023-02-13 | End: 2024-01-16 | Stop reason: SDUPTHER

## 2023-02-13 NOTE — LETTER
Date: 2023    Patient Name: Fadia Wilks   : 1980  MRN: [id number]   Patient Address: [address] [place] [address]      To Whom It May Concern:    Please excuse Ms. Tom Wilks from work from 2023 to  02/15/2023 she is experiencing low grade  fever, flu-like symptoms the possible urine infection.     Please feel free to contact me if you have any questions or need any additional information.     Sincerely,     Amparo Thomson M.D.  [phone]

## 2023-02-13 NOTE — PROGRESS NOTES
Patient ID: [id number]     Chief Complaint: Cough and Sinus Problem        HPI:   This is a telemedicine note. Patient was treated using telemedicine, real time audio and video, according to [facility] protocols. I, Amparo Thomson MD, conducted the visit from the [facility]. The patient participated in the visit at a non-[facility] location selected by the patient, identified below. I am licensed in the state where the patient stated they are located. The patient stated that they understood and accepted the privacy and security risks to their information at their location. This visit is not recorded.    Patient was located at the patient's home    Disclaimer:  This note is prepared using voice recognition software and as such is likely to have errors despite attempts at proofreading. Please contact me for questions.       Fadia Wilks is a 42 y.o. female or the following     Acute Issues-  Flu-like symptoms-  Patient reports that she has been having low-grade fever along with night sweats since couple of days.  Her nose is congested.  She has been having postnasal drip associated with sneezing.  It is associated with cough and nonproductive sputum.  She also has bilateral ear pains and feels full.  Denies of any headaches, nausea, vomiting, loss in appetite or weight, diarrhea, shortness of breath, chest pain or dizziness.    Chronic Issues-    ----------------------------  Attention deficit hyperactivity disorder (ADHD), predominantly   inattentive type  Gastroesophageal reflux disease  Generalized anxiety disorder  Major depressive disorder  Obesity  Raynaud's disease  Seasonal allergic rhinitis     Past Surgical History:   Procedure Laterality Date    BREAST BIOPSY  06/04/2019    HYSTERECTOMY  06/13/2019       Review of patient's allergies indicates:  No Known Allergies    Outpatient Medications Marked as Taking for the 2/13/23 encounter (Office Visit) with Amparo Thomson MD   Medication Sig  Dispense Refill    albuterol (PROVENTIL/VENTOLIN HFA) 90 mcg/actuation inhaler Inhale 2 puffs into the lungs every 4 (four) hours as needed.      ALPRAZolam (XANAX) 0.5 MG tablet Take 1 tablet (0.5 mg total) by mouth 2 (two) times daily. 60 tablet 0    amLODIPine (NORVASC) 5 MG tablet Take 0.5 tablets (2.5 mg total) by mouth Daily. 90 tablet 3    dextroamphetamine-amphetamine (ADDERALL XR) 30 MG 24 hr capsule Take 1 capsule (30 mg total) by mouth every morning. 30 capsule 0    loratadine (CLARITIN) 10 mg tablet Take 1 tablet (10 mg total) by mouth nightly as needed for Allergies. 90 tablet 3       Social History     Socioeconomic History    Marital status: Unknown   Tobacco Use    Smoking status: Never     Passive exposure: Never    Smokeless tobacco: Never   Substance and Sexual Activity    Alcohol use: Yes     Alcohol/week: 10.0 standard drinks     Types: 10 Glasses of wine per week     Comment: Socially or to unwind    Drug use: Never    Sexual activity: Yes     Partners: Male     Birth control/protection: See Surgical Hx        Family History   Problem Relation Age of Onset    Coronary artery disease Mother     Heart attack Mother 40    COPD Mother     Depression Mother     Diabetes Mother     Heart disease Mother     Hyperlipidemia Mother     Mental illness Mother         Bipolar    Coronary artery disease Father     COPD Father     Heart disease Father     Hyperlipidemia Father     Hypertension Father     Kidney disease Father     Cancer Maternal Aunt     Depression Brother     Mental illness Brother     Depression Sister     Depression Brother     Diabetes Brother     Early death Brother         Covid age 45    Diabetes Brother     Drug abuse Sister         Patient Care Team:  Amparo Thomson MD as PCP - General (Family Medicine)  Amparo Thomsno MD as PCP - Family Medicine (Family Medicine)  Penny Figueroa MD (Obstetrics and Gynecology)     Subjective:     ROS    See HPI for details    Answers  "submitted by the patient for this visit:  Cough Questionnaire (Submitted on 2/13/2023)  Chief Complaint: Cough  Chronicity: new  Onset: yesterday  Progression since onset: rapidly worsening  Frequency: hourly  Cough characteristics: productive of sputum  ear congestion: Yes  nasal congestion: Yes  postnasal drip: Yes  rhinorrhea: Yes  sweats: Yes  Aggravated by: nothing  Risk factors for lung disease: animal exposure, occupational exposure  asthma: No  bronchiectasis: No  bronchitis: Yes  COPD: No  emphysema: No  pneumonia: No  Treatments tried: OTC cough suppressant, leukotriene antagonists, rest  Improvement on treatment: mild  All Other ROS: Negative except as stated in HPI.       Objective:     Ht 5' 1" (1.549 m)   Wt 79.8 kg (176 lb)   BMI 33.25 kg/m²     Physical Exam    General: Alert and oriented, No acute distress.  Respiratory:  No visible shortness of breath or in distress.  Neurologic: No focal deficits  Psychiatric: Normal interaction, Coherent speech, Euthymic mood, Appropriate affect         Assessment:       ICD-10-CM ICD-9-CM   1. Otitis media, unspecified laterality, unspecified otitis media type  H66.90 382.9   2. Flu-like symptoms  R68.89 780.99   3. Allergy, initial encounter  T78.40XA 995.3        Plan:     1. Otitis media, unspecified laterality, unspecified otitis media type  - amoxicillin-clavulanate 875-125mg (AUGMENTIN) 875-125 mg per tablet; Take 1 tablet by mouth every 12 (twelve) hours. for 7 days  Dispense: 14 tablet; Refill: 0  precautions provided  Patient requested for work excuse  Excuse given     2. Flu-like symptoms  - COVID/RSV/FLU A&B PCR; Future    3. Allergy, initial encounter  - cetirizine (ZYRTEC) 10 MG tablet; Take 1 tablet (10 mg total) by mouth once daily.  Dispense: 30 tablet; Refill: 0  - fluticasone propionate (FLONASE) 50 mcg/actuation nasal spray; 2 sprays (100 mcg total) by Each Nostril route once daily.  Dispense: 16 g; Refill: 2           Medication List with " Changes/Refills   Current Medications    ALBUTEROL (PROVENTIL/VENTOLIN HFA) 90 MCG/ACTUATION INHALER    Inhale 2 puffs into the lungs every 4 (four) hours as needed.       Start Date: 10/14/2021End Date: --    ALPRAZOLAM (XANAX) 0.5 MG TABLET    Take 1 tablet (0.5 mg total) by mouth 2 (two) times daily.       Start Date: 8/4/2022  End Date: --    AMLODIPINE (NORVASC) 5 MG TABLET    Take 0.5 tablets (2.5 mg total) by mouth Daily.       Start Date: 10/11/2022End Date: --    DEXTROAMPHETAMINE-AMPHETAMINE (ADDERALL XR) 30 MG 24 HR CAPSULE    Take 1 capsule (30 mg total) by mouth every morning.       Start Date: 2/2/2023  End Date: --    LORATADINE (CLARITIN) 10 MG TABLET    Take 1 tablet (10 mg total) by mouth nightly as needed for Allergies.       Start Date: 10/11/2022End Date: --          Follow up for Keep previous scheduled follow-up. In addition to their scheduled follow up, the patient has also been instructed to follow up on as needed basis.

## 2023-03-06 DIAGNOSIS — F90.0 ATTENTION DEFICIT HYPERACTIVITY DISORDER (ADHD), PREDOMINANTLY INATTENTIVE TYPE: Chronic | ICD-10-CM

## 2023-03-06 NOTE — TELEPHONE ENCOUNTER
----- Message from Yehuda John sent at 3/6/2023  3:00 PM CST -----  .Type:  RX Refill Request    Who Called: pt  Refill or New Rx:refill  RX Name and Strength:dextroamphetamine-amphetamine (ADDERALL XR) 30 MG 24 hr capsule  How is the patient currently taking it? (ex. 1XDay):Take 1 capsule (30 mg total) by mouth every morning. - Oral  Is this a 30 day or 90 day RX: 30 day  Preferred Pharmacy with phone number:[facility] [id number] [place] [address]  Local or Mail Order:local  Ordering Provider:  Would the patient rather a call back or a response via [facility]? Call back  Best Call Back Number: [phone]  Additional Information:

## 2023-03-07 RX ORDER — DEXTROAMPHETAMINE SACCHARATE, AMPHETAMINE ASPARTATE MONOHYDRATE, DEXTROAMPHETAMINE SULFATE AND AMPHETAMINE SULFATE 7.5; 7.5; 7.5; 7.5 MG/1; MG/1; MG/1; MG/1
30 CAPSULE, EXTENDED RELEASE ORAL EVERY MORNING
Qty: 30 CAPSULE | Refills: 0 | Status: SHIPPED | OUTPATIENT
Start: 2023-03-07 | End: 2023-03-21 | Stop reason: SDUPTHER

## 2023-03-21 ENCOUNTER — PATIENT MESSAGE (OUTPATIENT)
Dept: FAMILY MEDICINE | Facility: CLINIC | Age: 43
End: 2023-03-21
Payer: COMMERCIAL

## 2023-03-21 DIAGNOSIS — F90.0 ATTENTION DEFICIT HYPERACTIVITY DISORDER (ADHD), PREDOMINANTLY INATTENTIVE TYPE: Chronic | ICD-10-CM

## 2023-03-21 RX ORDER — DEXTROAMPHETAMINE SACCHARATE, AMPHETAMINE ASPARTATE MONOHYDRATE, DEXTROAMPHETAMINE SULFATE AND AMPHETAMINE SULFATE 7.5; 7.5; 7.5; 7.5 MG/1; MG/1; MG/1; MG/1
30 CAPSULE, EXTENDED RELEASE ORAL EVERY MORNING
Qty: 30 CAPSULE | Refills: 0 | Status: SHIPPED | OUTPATIENT
Start: 2023-03-21 | End: 2023-09-26

## 2023-03-21 NOTE — TELEPHONE ENCOUNTER
----- Message from Angel Michel sent at 3/21/2023  9:27 AM CDT -----  .Type:  RX Refill Request    Who Called: Fadia  Refill or New Rx: Refill   RX Name and Strength: dextroamphetamine-amphetamine (ADDERALL XR) 30 MG 24 hr capsule and Omeprazole 20 mg.   How is the patient currently taking it? (ex. 1XDay):  Is this a 30 day or 90 day RX:  Preferred Pharmacy with phone number:  Local or Mail Order: Local   Ordering Provider: Alix  Would the patient rather a call back or a response via [facility]?   Best Call Back Number: [phone] or [phone], she works nights please leave message as she will not be available today.   Additional Information: She has been trying to have the medication adderoll refilled for a couple of weeks now. She need a call back to let her know if they can be done.

## 2023-03-22 ENCOUNTER — PATIENT MESSAGE (OUTPATIENT)
Dept: FAMILY MEDICINE | Facility: CLINIC | Age: 43
End: 2023-03-22
Payer: COMMERCIAL

## 2023-03-22 DIAGNOSIS — N39.0 RECURRENT UTI: Primary | ICD-10-CM

## 2023-03-22 RX ORDER — CIPROFLOXACIN 500 MG/1
500 TABLET ORAL EVERY 12 HOURS
Qty: 14 TABLET | Refills: 0 | Status: SHIPPED | OUTPATIENT
Start: 2023-03-22 | End: 2023-03-29

## 2023-04-17 ENCOUNTER — HOSPITAL ENCOUNTER (OUTPATIENT)
Dept: RADIOLOGY | Facility: HOSPITAL | Age: 43
Discharge: HOME | End: 2023-04-17
Attending: STUDENT IN AN ORGANIZED HEALTH CARE EDUCATION/TRAINING PROGRAM
Payer: COMMERCIAL

## 2023-04-17 DIAGNOSIS — D17.71 RENAL ANGIOMYOLIPOMA: Primary | ICD-10-CM

## 2023-04-17 DIAGNOSIS — N39.0 RECURRENT UTI: ICD-10-CM

## 2023-04-17 PROCEDURE — 76770 US EXAM ABDO BACK WALL COMP: CPT | Mod: TC

## 2023-04-17 NOTE — PROGRESS NOTES
Please inform patient of US kidney results    Echogenic lesions in the right and left kidney might represent nonshadowing calcifications, however, tiny angiomyolipomas cannot be completely excluded.    Angiomyolipomas (AMLs) are benign neoplasms occurring in the kidney and are composed of varying amounts of tissue resembling blood vessels, smooth muscles, and adipose     Patient has a CT abdomen to rule out if she has angiomyolipoma  and pelvis and a referral to Nephrology  Thanks,    Dr. Thomson

## 2023-04-18 ENCOUNTER — TELEPHONE (OUTPATIENT)
Dept: FAMILY MEDICINE | Facility: CLINIC | Age: 43
End: 2023-04-18
Payer: COMMERCIAL

## 2023-04-18 NOTE — TELEPHONE ENCOUNTER
Attempted to call patient about results. NA, Detailed message left explaining results to patient. Encouraged in the message for the patient to call us back if she had any further questions, comments, or concerns.     Will fax over referral today.

## 2023-04-26 DIAGNOSIS — M89.8X9 BONE ISLAND: ICD-10-CM

## 2023-04-26 DIAGNOSIS — D17.71 RENAL ANGIOMYOLIPOMA: Primary | ICD-10-CM

## 2023-05-11 ENCOUNTER — PATIENT MESSAGE (OUTPATIENT)
Dept: FAMILY MEDICINE | Facility: CLINIC | Age: 43
End: 2023-05-11

## 2023-05-11 ENCOUNTER — OFFICE VISIT (OUTPATIENT)
Dept: FAMILY MEDICINE | Facility: CLINIC | Age: 43
End: 2023-05-11
Payer: COMMERCIAL

## 2023-05-11 VITALS — BODY MASS INDEX: 30.8 KG/M2 | WEIGHT: 163 LBS

## 2023-05-11 DIAGNOSIS — R07.9 CHEST PAIN, UNSPECIFIED TYPE: ICD-10-CM

## 2023-05-11 DIAGNOSIS — E78.2 MIXED HYPERLIPIDEMIA: ICD-10-CM

## 2023-05-11 DIAGNOSIS — F98.8 ATTENTION DEFICIT DISORDER, UNSPECIFIED HYPERACTIVITY PRESENCE: ICD-10-CM

## 2023-05-11 DIAGNOSIS — K21.9 GASTROESOPHAGEAL REFLUX DISEASE, UNSPECIFIED WHETHER ESOPHAGITIS PRESENT: ICD-10-CM

## 2023-05-11 DIAGNOSIS — I73.00 RAYNAUD'S DISEASE WITHOUT GANGRENE: Chronic | ICD-10-CM

## 2023-05-11 PROCEDURE — 1159F MED LIST DOCD IN RCRD: CPT | Mod: CPTII,95,, | Performed by: STUDENT IN AN ORGANIZED HEALTH CARE EDUCATION/TRAINING PROGRAM

## 2023-05-11 PROCEDURE — 3008F PR BODY MASS INDEX (BMI) DOCUMENTED: ICD-10-PCS | Mod: CPTII,95,, | Performed by: STUDENT IN AN ORGANIZED HEALTH CARE EDUCATION/TRAINING PROGRAM

## 2023-05-11 PROCEDURE — 99214 PR OFFICE/OUTPT VISIT, EST, LEVL IV, 30-39 MIN: ICD-10-PCS | Mod: 95,,, | Performed by: STUDENT IN AN ORGANIZED HEALTH CARE EDUCATION/TRAINING PROGRAM

## 2023-05-11 PROCEDURE — 99214 OFFICE O/P EST MOD 30 MIN: CPT | Mod: 95,,, | Performed by: STUDENT IN AN ORGANIZED HEALTH CARE EDUCATION/TRAINING PROGRAM

## 2023-05-11 PROCEDURE — 1160F RVW MEDS BY RX/DR IN RCRD: CPT | Mod: CPTII,95,, | Performed by: STUDENT IN AN ORGANIZED HEALTH CARE EDUCATION/TRAINING PROGRAM

## 2023-05-11 PROCEDURE — 1159F PR MEDICATION LIST DOCUMENTED IN MEDICAL RECORD: ICD-10-PCS | Mod: CPTII,95,, | Performed by: STUDENT IN AN ORGANIZED HEALTH CARE EDUCATION/TRAINING PROGRAM

## 2023-05-11 PROCEDURE — 3008F BODY MASS INDEX DOCD: CPT | Mod: CPTII,95,, | Performed by: STUDENT IN AN ORGANIZED HEALTH CARE EDUCATION/TRAINING PROGRAM

## 2023-05-11 PROCEDURE — 1160F PR REVIEW ALL MEDS BY PRESCRIBER/CLIN PHARMACIST DOCUMENTED: ICD-10-PCS | Mod: CPTII,95,, | Performed by: STUDENT IN AN ORGANIZED HEALTH CARE EDUCATION/TRAINING PROGRAM

## 2023-05-11 RX ORDER — PANTOPRAZOLE SODIUM 40 MG/1
40 TABLET, DELAYED RELEASE ORAL DAILY
Qty: 90 TABLET | Refills: 0 | Status: SHIPPED | OUTPATIENT
Start: 2023-05-11 | End: 2023-08-08 | Stop reason: SDUPTHER

## 2023-05-11 RX ORDER — ATORVASTATIN CALCIUM 20 MG/1
20 TABLET, FILM COATED ORAL DAILY
Qty: 90 TABLET | Refills: 3 | Status: SHIPPED | OUTPATIENT
Start: 2023-05-11 | End: 2024-01-16 | Stop reason: SDUPTHER

## 2023-05-11 RX ORDER — AMLODIPINE BESYLATE 5 MG/1
5 TABLET ORAL DAILY
Qty: 90 TABLET | Refills: 3 | Status: SHIPPED | OUTPATIENT
Start: 2023-05-11 | End: 2024-01-16 | Stop reason: SDUPTHER

## 2023-05-11 NOTE — PROGRESS NOTES
[facility] Office Visit Note - ADD Follow-up    [unfilled]    Chief Complaint   3 month follow up       History of Present Illness   This is a telemedicine note. Patient was treated using telemedicine, real time audio and video, according to [facility] protocols. I, Amparo Thomson MD, conducted the visit from the [facility]. The patient participated in the visit at a non-[facility] location selected by the patient, identified below. I am licensed in the state where the patient stated they are located. The patient stated that they understood and accepted the privacy and security risks to their information at their location. This visit is not recorded.    Patient was located at the patient's home    Disclaimer:  This note is prepared using voice recognition software and as such is likely to have errors despite attempts at proofreading. Please contact me for questions.     Patient has a video visit regarding the following    Chest pain-patient reports that she has been having intermittent chest pain starting at rest lasting for more than 30 minutes, radiating to left arm.  It is dull achy.  She also reports that her mother had heart attack at age 40 and father had similar symptoms at age 55.  Denies of any shortness of breath, dizziness, falls, lower extremity edema, palpitations, floaters or blurry vision.    She would also like to get a refill for her ADHD medication    Acid reflux   Patient also reports that she is been having increased acid reflux problem and would like to get Protonix prescribed to her      Chronic issues  Anxiety-   On Prn Xanax  Well controlled     Raynaud's-   Was not taking Amlodipine  Feels fingers are cold and blue     Allergies-  On Claritin  And Prn albuterol     Depression-  Resolved  Denies Si/Hi      Review of Systems     GENERAL:   no significant weight changes, no tics, no insomnia  CARDIAC:  no chest pain, no palpations or SOB  ABD: no n/v, no diarrhea  Psych:  no increased  anxiety, medication working adequately    Physical Exam   There were no vitals filed for this visit.    GENERAL:  NAD, no significant weight loss  CHEST:  CTA bilaterally  CARDIAC:  RRR no murmurs audible  PSYCH: Appropriate mood, affect normal      Assessment and Plan   1. Chest pain, unspecified type  Recommend the patient to get the basic labs, EKG and echo   Referral to Cardiology made because of family history of significant cardiac disease  ER precautions provided  -     EKG 12-lead; Future  -     Echo; Future  -     CBC Auto Differential; Future; Expected date: 05/11/2023  -     Basic Metabolic Panel; Future; Expected date: 05/11/2023  -     Troponin I; Future; Expected date: 05/11/2023  -     Ambulatory referral/consult to Cardiology; Future; Expected date: 05/18/2023  -     BNP; Future; Expected date: 05/11/2023  -     TSH; Future; Expected date: 05/11/2023  -     X-Ray Chest PA And Lateral; Future; Expected date: 05/11/2023    2. Attention deficit disorder, unspecified hyperactivity presence  Medications for ADHD Adderall 30 mg not refilled as of now.  Recommend the patient to hold medication as of now until being cleared by cardiologist.  Patient voiced understanding and agrees with the plan.    3. Gastroesophageal reflux disease, unspecified whether esophagitis present  -     pantoprazole (PROTONIX) 40 MG tablet; Take 1 tablet (40 mg total) by mouth once daily.  Dispense: 90 tablet; Refill: 0  Start Protonix as prescribed   Avoid greasy, mint, chocolate in diet.  Also recommend to take her last meal not later than 7:00 p.m..    4. Mixed hyperlipidemia  -     atorvastatin (LIPITOR) 20 MG tablet; Take 1 tablet (20 mg total) by mouth once daily.  Dispense: 90 tablet; Refill: 3  Continue Statins as prescribed    5. Raynaud's disease without gangrene  -     amLODIPine (NORVASC) 5 MG tablet; Take 1 tablet (5 mg total) by mouth Daily.  Dispense: 90 tablet; Refill: 3  Continue amlodipine as prescribed   Avoid  cold         Follow up in about 6 weeks (around 6/22/2023) for Follow Up chest pain .     Education and counseling done face to face regarding medical conditions and plan. Contact office if new symptoms develop. Should any symptoms ever significantly worsen seek emergency medical attention/go to ER. Follow up at least yearly for wellness or sooner PRN. Nurse to call patient with any results. The patient is receptive, expresses understanding and is agreeable to plan. All questions have been answered.        Amparo Thomson M.D.    Answers submitted by the patient for this visit:  Review of Systems Questionnaire (Submitted on 5/11/2023)  activity change: No  unexpected weight change: No  neck pain: Yes  hearing loss: No  rhinorrhea: No  trouble swallowing: No  eye discharge: No  visual disturbance: No  chest tightness: Yes  wheezing: No  chest pain: Yes  palpitations: No  blood in stool: No  constipation: No  vomiting: No  diarrhea: No  polydipsia: No  polyuria: No  difficulty urinating: No  hematuria: No  menstrual problem: No  dysuria: No  joint swelling: No  arthralgias: No  headaches: No  weakness: No  confusion: No  dysphoric mood: No

## 2023-05-23 ENCOUNTER — PATIENT MESSAGE (OUTPATIENT)
Dept: FAMILY MEDICINE | Facility: CLINIC | Age: 43
End: 2023-05-23
Payer: COMMERCIAL

## 2023-05-25 ENCOUNTER — HOSPITAL ENCOUNTER (OUTPATIENT)
Dept: CARDIOLOGY | Facility: HOSPITAL | Age: 43
Discharge: HOME | End: 2023-05-25
Attending: STUDENT IN AN ORGANIZED HEALTH CARE EDUCATION/TRAINING PROGRAM
Payer: COMMERCIAL

## 2023-05-25 VITALS — HEIGHT: 61 IN | WEIGHT: 162.94 LBS | BODY MASS INDEX: 30.76 KG/M2

## 2023-05-25 DIAGNOSIS — R07.9 CHEST PAIN, UNSPECIFIED TYPE: ICD-10-CM

## 2023-05-25 LAB
AV INDEX (PROSTH): 0.67
AV MEAN GRADIENT: 3 MMHG
AV PEAK GRADIENT: 5 MMHG
AV VALVE AREA: 2.1 CM2
AV VELOCITY RATIO: 0.71
BSA FOR ECHO PROCEDURE: 1.78 M2
CV ECHO LV RWT: 0.36 CM
DOP CALC AO PEAK VEL: 1.11 M/S
DOP CALC AO VTI: 22.6 CM
DOP CALC LVOT AREA: 3.1 CM2
DOP CALC LVOT DIAMETER: 2 CM
DOP CALC LVOT PEAK VEL: 0.79 M/S
DOP CALC LVOT STROKE VOLUME: 47.41 CM3
DOP CALC MV VTI: 26.3 CM
DOP CALCLVOT PEAK VEL VTI: 15.1 CM
E WAVE DECELERATION TIME: 193 MSEC
E/A RATIO: 1.4
E/E' RATIO: 5.62 M/S
ECHO LV POSTERIOR WALL: 0.81 CM (ref 0.6–1.1)
EJECTION FRACTION: 62 %
FRACTIONAL SHORTENING: 41 % (ref 28–44)
INTERVENTRICULAR SEPTUM: 0.77 CM (ref 0.6–1.1)
LEFT ATRIUM SIZE: 3.2 CM
LEFT ATRIUM VOLUME INDEX MOD: 13.6 ML/M2
LEFT ATRIUM VOLUME MOD: 23.5 CM3
LEFT INTERNAL DIMENSION IN SYSTOLE: 2.62 CM (ref 2.1–4)
LEFT VENTRICLE DIASTOLIC VOLUME INDEX: 51.79 ML/M2
LEFT VENTRICLE DIASTOLIC VOLUME: 89.6 ML
LEFT VENTRICLE MASS INDEX: 63 G/M2
LEFT VENTRICLE SYSTOLIC VOLUME INDEX: 14.5 ML/M2
LEFT VENTRICLE SYSTOLIC VOLUME: 25.1 ML
LEFT VENTRICULAR INTERNAL DIMENSION IN DIASTOLE: 4.44 CM (ref 3.5–6)
LEFT VENTRICULAR MASS: 109.29 G
LV LATERAL E/E' RATIO: 4.87 M/S
LV SEPTAL E/E' RATIO: 6.64 M/S
LVOT MG: 1 MMHG
LVOT MV: 0.55 CM/S
MV MEAN GRADIENT: 2 MMHG
MV PEAK A VEL: 0.52 M/S
MV PEAK E VEL: 0.73 M/S
MV PEAK GRADIENT: 5 MMHG
MV STENOSIS PRESSURE HALF TIME: 58 MS
MV VALVE AREA BY CONTINUITY EQUATION: 1.8 CM2
MV VALVE AREA P 1/2 METHOD: 3.79 CM2
OHS LV EJECTION FRACTION SIMPSONS BIPLANE MOD: 6 %
PISA TR MAX VEL: 2.21 M/S
PV PEAK VELOCITY: 1.17 CM/S
RA PRESSURE: 3 MMHG
TDI LATERAL: 0.15 M/S
TDI SEPTAL: 0.11 M/S
TDI: 0.13 M/S
TR MAX PG: 20 MMHG
TRICUSPID ANNULAR PLANE SYSTOLIC EXCURSION: 1.93 CM
TV REST PULMONARY ARTERY PRESSURE: 23 MMHG

## 2023-05-25 PROCEDURE — 93306 TTE W/DOPPLER COMPLETE: CPT

## 2023-05-26 ENCOUNTER — PATIENT MESSAGE (OUTPATIENT)
Dept: FAMILY MEDICINE | Facility: CLINIC | Age: 43
End: 2023-05-26
Payer: COMMERCIAL

## 2023-05-26 NOTE — PROGRESS NOTES
Echo-      I have reviewed your echo results, which are all within acceptable ranges for your age and conditions. No change in current treatments is needed at this time

## 2023-08-08 DIAGNOSIS — K21.9 GASTROESOPHAGEAL REFLUX DISEASE, UNSPECIFIED WHETHER ESOPHAGITIS PRESENT: ICD-10-CM

## 2023-08-08 RX ORDER — PANTOPRAZOLE SODIUM 40 MG/1
40 TABLET, DELAYED RELEASE ORAL DAILY
Qty: 90 TABLET | Refills: 0 | Status: SHIPPED | OUTPATIENT
Start: 2023-08-08 | End: 2023-12-10 | Stop reason: SDUPTHER

## 2023-09-06 ENCOUNTER — TELEPHONE (OUTPATIENT)
Dept: ORTHOPEDICS | Facility: CLINIC | Age: 43
End: 2023-09-06
Payer: COMMERCIAL

## 2023-09-06 NOTE — TELEPHONE ENCOUNTER
Attempt to contact patient regarding rescheduling an appointment. Left message stating that her appointment has been rescheduled for 11:30 am on 10/16/2023. Also left number for patient to return call back to [phone]. Thanks.

## 2023-09-15 NOTE — TELEPHONE ENCOUNTER
----- Message from De'Ayleen Crane sent at 9/8/2022  9:36 AM CDT -----  Regarding: RX REQUEST  Type:  RX Refill Request    Who Called: pt  Refill or New Rx: refill  RX Name and Strength: dextroamphetamine-amphetamine   How is the patient currently taking it? (ex. 1XDay): once daily   Is this a 30 day or 90 day RX: 30 tablets   Preferred Pharmacy with phone number: [facility] - [place], [place] - [address]  Local or Mail Order: local  Ordering Provider: Anthony Moreno NP   Would the patient rather a call back or a response via [facility]?   Best Call Back Number: mobile  Additional Information:          show

## 2023-09-26 ENCOUNTER — OFFICE VISIT (OUTPATIENT)
Dept: FAMILY MEDICINE | Facility: CLINIC | Age: 43
End: 2023-09-26
Payer: COMMERCIAL

## 2023-09-26 VITALS — WEIGHT: 180 LBS | BODY MASS INDEX: 33.99 KG/M2 | HEIGHT: 61 IN

## 2023-09-26 DIAGNOSIS — F90.2 ATTENTION DEFICIT HYPERACTIVITY DISORDER (ADHD), COMBINED TYPE: ICD-10-CM

## 2023-09-26 DIAGNOSIS — Z12.31 BREAST CANCER SCREENING BY MAMMOGRAM: ICD-10-CM

## 2023-09-26 DIAGNOSIS — K21.9 GASTROESOPHAGEAL REFLUX DISEASE WITHOUT ESOPHAGITIS: ICD-10-CM

## 2023-09-26 PROCEDURE — 1160F PR REVIEW ALL MEDS BY PRESCRIBER/CLIN PHARMACIST DOCUMENTED: ICD-10-PCS | Mod: CPTII,95,, | Performed by: STUDENT IN AN ORGANIZED HEALTH CARE EDUCATION/TRAINING PROGRAM

## 2023-09-26 PROCEDURE — 1160F RVW MEDS BY RX/DR IN RCRD: CPT | Mod: CPTII,95,, | Performed by: STUDENT IN AN ORGANIZED HEALTH CARE EDUCATION/TRAINING PROGRAM

## 2023-09-26 PROCEDURE — 1159F MED LIST DOCD IN RCRD: CPT | Mod: CPTII,95,, | Performed by: STUDENT IN AN ORGANIZED HEALTH CARE EDUCATION/TRAINING PROGRAM

## 2023-09-26 PROCEDURE — 3008F BODY MASS INDEX DOCD: CPT | Mod: CPTII,95,, | Performed by: STUDENT IN AN ORGANIZED HEALTH CARE EDUCATION/TRAINING PROGRAM

## 2023-09-26 PROCEDURE — 3008F PR BODY MASS INDEX (BMI) DOCUMENTED: ICD-10-PCS | Mod: CPTII,95,, | Performed by: STUDENT IN AN ORGANIZED HEALTH CARE EDUCATION/TRAINING PROGRAM

## 2023-09-26 PROCEDURE — 99213 OFFICE O/P EST LOW 20 MIN: CPT | Mod: 95,,, | Performed by: STUDENT IN AN ORGANIZED HEALTH CARE EDUCATION/TRAINING PROGRAM

## 2023-09-26 PROCEDURE — 99213 PR OFFICE/OUTPT VISIT, EST, LEVL III, 20-29 MIN: ICD-10-PCS | Mod: 95,,, | Performed by: STUDENT IN AN ORGANIZED HEALTH CARE EDUCATION/TRAINING PROGRAM

## 2023-09-26 PROCEDURE — 1159F PR MEDICATION LIST DOCUMENTED IN MEDICAL RECORD: ICD-10-PCS | Mod: CPTII,95,, | Performed by: STUDENT IN AN ORGANIZED HEALTH CARE EDUCATION/TRAINING PROGRAM

## 2023-09-26 RX ORDER — SUCRALFATE 1 G/1
1 TABLET ORAL
Qty: 56 TABLET | Refills: 0 | Status: SHIPPED | OUTPATIENT
Start: 2023-09-26 | End: 2023-10-10

## 2023-09-26 NOTE — PROGRESS NOTES
[facility] Office Visit Note - ADD Follow-up    [unfilled]    Chief Complaint   No chief complaint on file.       History of Present Illness   This is a telemedicine note. Patient was treated using telemedicine, real time audio and video, according to [facility] protocols. Amparo STAUFFER MD, conducted the visit from the [facility]. The patient participated in the visit at a non-[facility] location selected by the patient, identified below. I am licensed in the state where the patient stated they are located. The patient stated that they understood and accepted the privacy and security risks to their information at their location. This visit is not recorded.    Patient was located at the patient's home    Disclaimer:  This note is prepared using voice recognition software and as such is likely to have errors despite attempts at proofreading. Please contact me for questions.     ADHD  The patient presents today for follow-up and reevaluation of current ADHD diagnosis.  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  Would like to switch to immediate release.  The patient reports no present or intolerable side effects to the medication regimen. The patient reports no change in primary pharmacy since the last documented visit with our office.  Chest pain totally resolved.  Attributes it to at sitter reflux.  EKG results pending.  Echo was unremarkable.    Acid reflux   Symptoms controlled on Protonix.  Was low  Would like to add another regimen for better control        Chronic issues  Anxiety-   On Prn Xanax  Well controlled     Raynaud's-   Was not taking Amlodipine  Feels fingers are cold and blue     Allergies-  On Claritin  And Prn albuterol     Depression-  Resolved  Denies Si/Hi    Review of Systems     GENERAL:   no significant weight changes, no tics, no insomnia  CARDIAC:  no chest pain, no palpations or SOB  ABD: no  "n/v, no diarrhea  Psych:  no increased anxiety, medication working adequately    Physical Exam   Visit Vitals  Ht 5' 1" (1.549 m)   Wt 81.6 kg (180 lb)   BMI 34.01 kg/m²     GENERAL:  NAD, no significant weight loss  CHEST:  CTA bilaterally  CARDIAC:  RRR no murmurs audible  PSYCH: Appropriate mood, affect normal      Assessment and Plan   1. Attention deficit hyperactivity disorder (ADHD), combined type  -     EKG 12-lead; Future  EKG results and blood work pending   Echo unremarkable   Chest pain resolved.  Chest discomfort was likely due to acid reflux   Would change Adderall extended release to immediate release after the EKG results come back      2. Gastroesophageal reflux disease without esophagitis  -     sucralfate (CARAFATE) 1 gram tablet; Take 1 tablet (1 g total) by mouth 4 (four) times daily before meals and nightly. for 14 days  Dispense: 56 tablet; Refill: 0  Recommend to lose 10% of body weight   Avoid fatty, greasy, tomatoes, and mint in the diet   Recommend to take last meal of the day no later than 7:00 p.m.     3. Breast cancer screening by mammogram  -     Mammo Digital Screening Bilat w/ Pratik; Future; Expected date: 02/07/2024         -Regimen is efficacious/no side effects/no change to medication regimen.  -Continue medication regimen  change as above  - checked in past and as indicated  -Scheduled drug contract signed/reviewed  -Medication side effect profile discussed at length    Follow up in about 3 months (around 12/26/2023) for Virtual Visit, ADD Follow Up.     Education and counseling done face to face regarding medical conditions and plan. Contact office if new symptoms develop. Should any symptoms ever significantly worsen seek emergency medical attention/go to ER. Follow up at least yearly for wellness or sooner PRN. Nurse to call patient with any results. The patient is receptive, expresses understanding and is agreeable to plan. All questions have been answered.        Amparo" EUNICE Thomson    Video Time Documentation:  Spent 10 minutes with patient face to face discussed health concerns. More than 50% of this time was spent in counseling and coordination of care.    Answers submitted by the patient for this visit:  Review of Systems Questionnaire (Submitted on 9/26/2023)  activity change: No  unexpected weight change: No  neck pain: Yes  hearing loss: No  rhinorrhea: No  trouble swallowing: No  eye discharge: No  visual disturbance: No  chest tightness: No  wheezing: No  chest pain: No  palpitations: No  blood in stool: No  constipation: No  vomiting: No  diarrhea: No  polydipsia: No  polyuria: No  difficulty urinating: No  hematuria: No  menstrual problem: No  dysuria: No  joint swelling: No  arthralgias: No  headaches: No  weakness: No  confusion: No  dysphoric mood: No

## 2023-09-27 DIAGNOSIS — M54.2 NECK PAIN: Primary | ICD-10-CM

## 2023-10-02 ENCOUNTER — PATIENT MESSAGE (OUTPATIENT)
Dept: FAMILY MEDICINE | Facility: CLINIC | Age: 43
End: 2023-10-02
Payer: COMMERCIAL

## 2023-10-03 ENCOUNTER — PATIENT MESSAGE (OUTPATIENT)
Dept: FAMILY MEDICINE | Facility: CLINIC | Age: 43
End: 2023-10-03
Payer: COMMERCIAL

## 2023-10-04 DIAGNOSIS — F90.0 ATTENTION DEFICIT HYPERACTIVITY DISORDER (ADHD), PREDOMINANTLY INATTENTIVE TYPE: Chronic | ICD-10-CM

## 2023-10-04 NOTE — TELEPHONE ENCOUNTER
----- Message from Angel Michel sent at 10/4/2023  8:23 AM CDT -----  .Type:  Needs Medical Advice    Who Called: Fadia  Symptoms (please be specific):    How long has patient had these symptoms:    Pharmacy name and phone #:    Would the patient rather a call back or a response via [facility]?   Best Call Back Number: [phone]  Additional Information: She needs to speak with the nurse she has completed the EKG they should have sent the results to the doctor's  Something also about her medication she will need did not specified.

## 2023-10-05 ENCOUNTER — PATIENT MESSAGE (OUTPATIENT)
Dept: FAMILY MEDICINE | Facility: CLINIC | Age: 43
End: 2023-10-05
Payer: COMMERCIAL

## 2023-10-05 ENCOUNTER — DOCUMENTATION ONLY (OUTPATIENT)
Dept: FAMILY MEDICINE | Facility: CLINIC | Age: 43
End: 2023-10-05
Payer: COMMERCIAL

## 2023-10-05 DIAGNOSIS — F90.0 ATTENTION DEFICIT HYPERACTIVITY DISORDER (ADHD), PREDOMINANTLY INATTENTIVE TYPE: Primary | Chronic | ICD-10-CM

## 2023-10-05 RX ORDER — DEXTROAMPHETAMINE SACCHARATE, AMPHETAMINE ASPARTATE, DEXTROAMPHETAMINE SULFATE AND AMPHETAMINE SULFATE 7.5; 7.5; 7.5; 7.5 MG/1; MG/1; MG/1; MG/1
30 TABLET ORAL EVERY MORNING
Qty: 30 TABLET | Refills: 0 | Status: SHIPPED | OUTPATIENT
Start: 2023-10-05 | End: 2023-10-05 | Stop reason: SDUPTHER

## 2023-10-06 RX ORDER — DEXTROAMPHETAMINE SACCHARATE, AMPHETAMINE ASPARTATE, DEXTROAMPHETAMINE SULFATE AND AMPHETAMINE SULFATE 7.5; 7.5; 7.5; 7.5 MG/1; MG/1; MG/1; MG/1
30 TABLET ORAL EVERY MORNING
Qty: 30 TABLET | Refills: 0 | Status: SHIPPED | OUTPATIENT
Start: 2023-10-06 | End: 2023-10-10 | Stop reason: SDUPTHER

## 2023-10-10 DIAGNOSIS — F90.0 ATTENTION DEFICIT HYPERACTIVITY DISORDER (ADHD), PREDOMINANTLY INATTENTIVE TYPE: Chronic | ICD-10-CM

## 2023-10-10 RX ORDER — DEXTROAMPHETAMINE SACCHARATE, AMPHETAMINE ASPARTATE, DEXTROAMPHETAMINE SULFATE AND AMPHETAMINE SULFATE 7.5; 7.5; 7.5; 7.5 MG/1; MG/1; MG/1; MG/1
30 TABLET ORAL EVERY MORNING
Qty: 30 TABLET | Refills: 0 | Status: SHIPPED | OUTPATIENT
Start: 2023-10-10 | End: 2023-10-11 | Stop reason: SDUPTHER

## 2023-10-11 RX ORDER — DEXTROAMPHETAMINE SACCHARATE, AMPHETAMINE ASPARTATE, DEXTROAMPHETAMINE SULFATE AND AMPHETAMINE SULFATE 7.5; 7.5; 7.5; 7.5 MG/1; MG/1; MG/1; MG/1
30 TABLET ORAL EVERY MORNING
Qty: 30 TABLET | Refills: 0 | Status: SHIPPED | OUTPATIENT
Start: 2023-10-11 | End: 2023-12-10 | Stop reason: SDUPTHER

## 2023-10-16 ENCOUNTER — OFFICE VISIT (OUTPATIENT)
Dept: ORTHOPEDIC SURGERY | Facility: CLINIC | Age: 43
End: 2023-10-16
Payer: COMMERCIAL

## 2023-10-16 ENCOUNTER — HOSPITAL ENCOUNTER (OUTPATIENT)
Dept: RADIOLOGY | Facility: CLINIC | Age: 43
Discharge: HOME | End: 2023-10-16
Attending: ORTHOPAEDIC SURGERY
Payer: COMMERCIAL

## 2023-10-16 VITALS — HEIGHT: 61 IN | WEIGHT: 179.88 LBS | BODY MASS INDEX: 33.96 KG/M2

## 2023-10-16 DIAGNOSIS — M54.2 NECK PAIN: Primary | ICD-10-CM

## 2023-10-16 DIAGNOSIS — M54.12 CERVICAL RADICULOPATHY: ICD-10-CM

## 2023-10-16 DIAGNOSIS — M48.02 SPINAL STENOSIS, CERVICAL REGION: ICD-10-CM

## 2023-10-16 DIAGNOSIS — M54.2 NECK PAIN: ICD-10-CM

## 2023-10-16 PROCEDURE — 72050 XR CERVICAL SPINE AP LAT WITH FLEX EXTEN: ICD-10-PCS | Mod: ,,, | Performed by: ORTHOPAEDIC SURGERY

## 2023-10-16 PROCEDURE — 1160F RVW MEDS BY RX/DR IN RCRD: CPT | Mod: CPTII,S$GLB,, | Performed by: ORTHOPAEDIC SURGERY

## 2023-10-16 PROCEDURE — 1159F PR MEDICATION LIST DOCUMENTED IN MEDICAL RECORD: ICD-10-PCS | Mod: CPTII,S$GLB,, | Performed by: ORTHOPAEDIC SURGERY

## 2023-10-16 PROCEDURE — 72050 X-RAY EXAM NECK SPINE 4/5VWS: CPT | Mod: ,,, | Performed by: ORTHOPAEDIC SURGERY

## 2023-10-16 PROCEDURE — 3008F BODY MASS INDEX DOCD: CPT | Mod: CPTII,S$GLB,, | Performed by: ORTHOPAEDIC SURGERY

## 2023-10-16 PROCEDURE — 1159F MED LIST DOCD IN RCRD: CPT | Mod: CPTII,S$GLB,, | Performed by: ORTHOPAEDIC SURGERY

## 2023-10-16 PROCEDURE — 3008F PR BODY MASS INDEX (BMI) DOCUMENTED: ICD-10-PCS | Mod: CPTII,S$GLB,, | Performed by: ORTHOPAEDIC SURGERY

## 2023-10-16 PROCEDURE — 99204 OFFICE O/P NEW MOD 45 MIN: CPT | Mod: S$GLB,,, | Performed by: ORTHOPAEDIC SURGERY

## 2023-10-16 PROCEDURE — 99204 PR OFFICE/OUTPT VISIT, NEW, LEVL IV, 45-59 MIN: ICD-10-PCS | Mod: S$GLB,,, | Performed by: ORTHOPAEDIC SURGERY

## 2023-10-16 PROCEDURE — 1160F PR REVIEW ALL MEDS BY PRESCRIBER/CLIN PHARMACIST DOCUMENTED: ICD-10-PCS | Mod: CPTII,S$GLB,, | Performed by: ORTHOPAEDIC SURGERY

## 2023-10-16 RX ORDER — CYCLOBENZAPRINE HCL 10 MG
10 TABLET ORAL 3 TIMES DAILY PRN
Qty: 60 TABLET | Refills: 1 | Status: SHIPPED | OUTPATIENT
Start: 2023-10-16

## 2023-10-16 RX ORDER — MELOXICAM 15 MG/1
15 TABLET ORAL DAILY
Qty: 60 TABLET | Refills: 1 | Status: SHIPPED | OUTPATIENT
Start: 2023-10-16 | End: 2024-03-12
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
## 2023-11-13 ENCOUNTER — OFFICE VISIT (OUTPATIENT)
Dept: ORTHOPEDIC SURGERY | Facility: CLINIC | Age: 43
End: 2023-11-13
Payer: COMMERCIAL

## 2023-11-13 DIAGNOSIS — M54.2 NECK PAIN: Primary | ICD-10-CM

## 2023-11-13 DIAGNOSIS — M50.30 DDD (DEGENERATIVE DISC DISEASE), CERVICAL: ICD-10-CM

## 2023-11-13 PROCEDURE — 1160F PR REVIEW ALL MEDS BY PRESCRIBER/CLIN PHARMACIST DOCUMENTED: ICD-10-PCS | Mod: CPTII,S$GLB,, | Performed by: ORTHOPAEDIC SURGERY

## 2023-11-13 PROCEDURE — 1159F MED LIST DOCD IN RCRD: CPT | Mod: CPTII,S$GLB,, | Performed by: ORTHOPAEDIC SURGERY

## 2023-11-13 PROCEDURE — 99214 OFFICE O/P EST MOD 30 MIN: CPT | Mod: S$GLB,,, | Performed by: ORTHOPAEDIC SURGERY

## 2023-11-13 PROCEDURE — 99214 PR OFFICE/OUTPT VISIT, EST, LEVL IV, 30-39 MIN: ICD-10-PCS | Mod: S$GLB,,, | Performed by: ORTHOPAEDIC SURGERY

## 2023-11-13 PROCEDURE — 1160F RVW MEDS BY RX/DR IN RCRD: CPT | Mod: CPTII,S$GLB,, | Performed by: ORTHOPAEDIC SURGERY

## 2023-11-13 PROCEDURE — 1159F PR MEDICATION LIST DOCUMENTED IN MEDICAL RECORD: ICD-10-PCS | Mod: CPTII,S$GLB,, | Performed by: ORTHOPAEDIC SURGERY

## 2023-11-13 NOTE — PROGRESS NOTES
DATE: 11/13/2023  PATIENT: Fadia Wilks    Attending Physician: Jorge Spangler M.D.    HISTORY:  Fadia Wilks is a 42 y.o. female who returns to me today for MRI review. Patient continues to have neck pain that radiates to both shoulders. She has been taking meds with some relief. She would like to continue conservative management. She is scheduled to have breast reduction surgery in Dec 2023.    The Patient denies myelopathic symptoms such as handwriting changes or difficulty with buttons/coins/keys. Denies perineal paresthesias, bowel/bladder dysfunction.    The patient does not smoke, have DM or endorse IVDU. The patient is not on any blood thinners and does not take chronic narcotics. She works as a nurse.    PMH/PSH/FamHx/SocHx:  Unchanged from prior visit    ROS:  Positive for neck pain and b/l shoulder pain  Denies myelopathic symptoms, perineal paresthesias, bowel or bladder incontinence    EXAM:  There were no vitals taken for this visit.    My physical examination was notable for the following findings: motor intact BUE; SILT    IMAGING:  Today I independently reviewed the following images and my interpretations are as follows:    Previous AP and Lat upright C-spine films showed no fractures or listhesis.     MRI cervical showed no significant stenosis.    ASSESSMENT/PLAN:  Patient has neck pain. MRI is not impressive; no ortho spine surgical intervention warranted. I discussed the natural history of their diagnoses as well as surgical and nonsurgical treatment options. I educated the patient on the importance of core/back strengthening, correct posture, bending/lifting ergonomics, and low-impact aerobic exercises (walking, elliptical, and aquatherapy). Continue meds and exercises. I referred pt to Pain Management for possible injections. Patient will follow up PRN.    Jorge Spangler MD  Orthopaedic Spine Surgeon  Department of Orthopaedic Surgery  [phone]

## 2023-11-21 ENCOUNTER — PATIENT MESSAGE (OUTPATIENT)
Dept: ORTHOPEDIC SURGERY | Facility: CLINIC | Age: 43
End: 2023-11-21
Payer: COMMERCIAL

## 2023-12-09 ENCOUNTER — HOSPITAL ENCOUNTER (EMERGENCY)
Facility: HOSPITAL | Age: 43
Discharge: HOME | End: 2023-12-10
Attending: EMERGENCY MEDICINE
Payer: COMMERCIAL

## 2023-12-09 VITALS
DIASTOLIC BLOOD PRESSURE: 97 MMHG | RESPIRATION RATE: 22 BRPM | OXYGEN SATURATION: 98 % | WEIGHT: 189 LBS | TEMPERATURE: 98 F | HEART RATE: 112 BPM | SYSTOLIC BLOOD PRESSURE: 131 MMHG | HEIGHT: 62 IN | BODY MASS INDEX: 34.78 KG/M2

## 2023-12-09 DIAGNOSIS — R60.0 PERIPHERAL EDEMA: Primary | ICD-10-CM

## 2023-12-09 DIAGNOSIS — E88.09 HYPOALBUMINEMIA: ICD-10-CM

## 2023-12-09 DIAGNOSIS — E87.6 HYPOKALEMIA: ICD-10-CM

## 2023-12-09 LAB
ALBUMIN SERPL-MCNC: 2.5 G/DL (ref 3.5–5)
ALBUMIN/GLOB SERPL: 0.6 RATIO (ref 1.1–2)
ALP SERPL-CCNC: 150 UNIT/L (ref 40–150)
ALT SERPL-CCNC: 67 UNIT/L (ref 0–55)
APPEARANCE UR: CLEAR
AST SERPL-CCNC: 53 UNIT/L (ref 5–34)
BACTERIA #/AREA URNS AUTO: ABNORMAL /HPF
BASOPHILS # BLD AUTO: 0.03 X10(3)/MCL
BASOPHILS NFR BLD AUTO: 0.3 %
BILIRUB SERPL-MCNC: 0.4 MG/DL
BILIRUB UR QL STRIP.AUTO: NEGATIVE
BUN SERPL-MCNC: 9 MG/DL (ref 7–18.7)
CALCIUM SERPL-MCNC: 9.2 MG/DL (ref 8.4–10.2)
CHLORIDE SERPL-SCNC: 103 MMOL/L (ref 98–107)
CO2 SERPL-SCNC: 27 MMOL/L (ref 22–29)
COLOR UR AUTO: ABNORMAL
CREAT SERPL-MCNC: 0.7 MG/DL (ref 0.55–1.02)
EOSINOPHIL # BLD AUTO: 0.09 X10(3)/MCL (ref 0–0.9)
EOSINOPHIL NFR BLD AUTO: 1 %
ERYTHROCYTE [DISTWIDTH] IN BLOOD BY AUTOMATED COUNT: 12.8 % (ref 11.5–17)
GFR SERPLBLD CREATININE-BSD FMLA CKD-EPI: >60 MLS/MIN/1.73/M2
GLOBULIN SER-MCNC: 3.9 GM/DL (ref 2.4–3.5)
GLUCOSE SERPL-MCNC: 99 MG/DL (ref 74–100)
GLUCOSE UR QL STRIP.AUTO: NORMAL
HCT VFR BLD AUTO: 29.1 % (ref 37–47)
HGB BLD-MCNC: 9.5 G/DL (ref 12–16)
IMM GRANULOCYTES # BLD AUTO: 0.18 X10(3)/MCL (ref 0–0.04)
IMM GRANULOCYTES NFR BLD AUTO: 2 %
KETONES UR QL STRIP.AUTO: NEGATIVE
LEUKOCYTE ESTERASE UR QL STRIP.AUTO: 75
LYMPHOCYTES # BLD AUTO: 2.26 X10(3)/MCL (ref 0.6–4.6)
LYMPHOCYTES NFR BLD AUTO: 25.1 %
MAGNESIUM SERPL-MCNC: 2 MG/DL (ref 1.6–2.6)
MCH RBC QN AUTO: 30.4 PG (ref 27–31)
MCHC RBC AUTO-ENTMCNC: 32.6 G/DL (ref 33–36)
MCV RBC AUTO: 93 FL (ref 80–94)
MONOCYTES # BLD AUTO: 0.51 X10(3)/MCL (ref 0.1–1.3)
MONOCYTES NFR BLD AUTO: 5.7 %
NEUTROPHILS # BLD AUTO: 5.92 X10(3)/MCL (ref 2.1–9.2)
NEUTROPHILS NFR BLD AUTO: 65.9 %
NITRITE UR QL STRIP.AUTO: NEGATIVE
NRBC BLD AUTO-RTO: 0 %
PH UR STRIP.AUTO: 7 [PH]
PLATELET # BLD AUTO: 371 X10(3)/MCL (ref 130–400)
PMV BLD AUTO: 8.3 FL (ref 7.4–10.4)
POTASSIUM SERPL-SCNC: 3.2 MMOL/L (ref 3.5–5.1)
PROT SERPL-MCNC: 6.4 GM/DL (ref 6.4–8.3)
PROT UR QL STRIP.AUTO: NEGATIVE
RBC # BLD AUTO: 3.13 X10(6)/MCL (ref 4.2–5.4)
RBC #/AREA URNS AUTO: ABNORMAL /HPF
RBC UR QL AUTO: NEGATIVE
SODIUM SERPL-SCNC: 138 MMOL/L (ref 136–145)
SP GR UR STRIP.AUTO: 1.01 (ref 1–1.03)
SQUAMOUS #/AREA URNS LPF: ABNORMAL /HPF
UROBILINOGEN UR STRIP-ACNC: NORMAL
WBC # SPEC AUTO: 8.99 X10(3)/MCL (ref 4.5–11.5)
WBC #/AREA URNS AUTO: ABNORMAL /HPF

## 2023-12-09 PROCEDURE — 99284 EMERGENCY DEPT VISIT MOD MDM: CPT | Mod: 25

## 2023-12-09 PROCEDURE — 80053 COMPREHEN METABOLIC PANEL: CPT | Performed by: EMERGENCY MEDICINE

## 2023-12-09 PROCEDURE — 63600175 PHARM REV CODE 636 W HCPCS: Performed by: EMERGENCY MEDICINE

## 2023-12-09 PROCEDURE — 85025 COMPLETE CBC W/AUTO DIFF WBC: CPT | Performed by: EMERGENCY MEDICINE

## 2023-12-09 PROCEDURE — 81001 URINALYSIS AUTO W/SCOPE: CPT | Performed by: EMERGENCY MEDICINE

## 2023-12-09 PROCEDURE — 83735 ASSAY OF MAGNESIUM: CPT | Performed by: EMERGENCY MEDICINE

## 2023-12-09 PROCEDURE — 96374 THER/PROPH/DIAG INJ IV PUSH: CPT

## 2023-12-09 RX ORDER — POTASSIUM CHLORIDE 20 MEQ/1
20 TABLET, EXTENDED RELEASE ORAL 2 TIMES DAILY
Qty: 20 TABLET | Refills: 0 | Status: SHIPPED | OUTPATIENT
Start: 2023-12-09 | End: 2023-12-19

## 2023-12-09 RX ORDER — FUROSEMIDE 10 MG/ML
40 INJECTION INTRAMUSCULAR; INTRAVENOUS
Status: COMPLETED | OUTPATIENT
Start: 2023-12-09 | End: 2023-12-09

## 2023-12-09 RX ORDER — FUROSEMIDE 40 MG/1
40 TABLET ORAL DAILY
Qty: 7 TABLET | Refills: 0 | Status: SHIPPED | OUTPATIENT
Start: 2023-12-09 | End: 2024-03-12

## 2023-12-09 RX ADMIN — FUROSEMIDE 40 MG: 10 INJECTION, SOLUTION INTRAMUSCULAR; INTRAVENOUS at 11:12

## 2023-12-10 DIAGNOSIS — F90.0 ATTENTION DEFICIT HYPERACTIVITY DISORDER (ADHD), PREDOMINANTLY INATTENTIVE TYPE: Chronic | ICD-10-CM

## 2023-12-10 DIAGNOSIS — K21.9 GASTROESOPHAGEAL REFLUX DISEASE, UNSPECIFIED WHETHER ESOPHAGITIS PRESENT: ICD-10-CM

## 2023-12-10 NOTE — ED PROVIDER NOTES
Encounter Date: 12/9/2023       History     Chief Complaint   Patient presents with    Leg Swelling     Here from home states surgery on Monday ([place]) and is having swelling in her legs, abd decreased urine out put.      The history is provided by the patient.   Illness   The current episode started two days ago. Nothing relieves the symptoms. Nothing aggravates the symptoms. Pertinent negatives include no fever, no nausea, no sore throat, no shortness of breath and no rash.   Patient is 5 days s/p breast reduction, liposuction and abdominoplasty by Dr. Roberts.  Reports BLE edema and abdominal wall swelling over the past 48 hours.    Review of patient's allergies indicates:  No Known Allergies  Past Medical History:   Diagnosis Date    Attention deficit hyperactivity disorder (ADHD), predominantly inattentive type 5/23/2022    Gastroesophageal reflux disease 5/23/2022    Generalized anxiety disorder 5/23/2022    Major depressive disorder 5/23/2022    Obesity 5/23/2022    Raynaud's disease 5/23/2022    Seasonal allergic rhinitis 5/23/2022     Past Surgical History:   Procedure Laterality Date    BREAST BIOPSY  06/04/2019    HYSTERECTOMY  06/13/2019     Family History   Problem Relation Age of Onset    Coronary artery disease Mother     Heart attack Mother 40    COPD Mother     Depression Mother     Diabetes Mother     Heart disease Mother     Hyperlipidemia Mother     Mental illness Mother         Bipolar    Coronary artery disease Father     COPD Father     Heart disease Father     Hyperlipidemia Father     Hypertension Father     Kidney disease Father     Cancer Maternal Aunt     Depression Brother     Mental illness Brother     Depression Sister     Depression Brother     Diabetes Brother     Early death Brother         Covid age 45    Diabetes Brother     Drug abuse Sister      Social History     Tobacco Use    Smoking status: Never     Passive exposure: Never    Smokeless tobacco: Never   Substance Use  Topics    Alcohol use: Yes     Alcohol/week: 10.0 standard drinks of alcohol     Types: 10 Glasses of wine per week     Comment: Socially or to unwind    Drug use: Never     Review of Systems   Constitutional:  Negative for fever.   HENT:  Negative for sore throat.    Respiratory:  Negative for shortness of breath.    Cardiovascular:  Negative for chest pain.   Gastrointestinal:  Negative for nausea.   Genitourinary:  Negative for dysuria.   Musculoskeletal:  Negative for back pain.   Skin:  Negative for rash.   Neurological:  Negative for weakness.   Hematological:  Does not bruise/bleed easily.       Physical Exam     Initial Vitals [23 2203]   BP Pulse Resp Temp SpO2   (!) 149/97 (!) 111 (!) 22 97.9 °F (36.6 °C) 100 %      MAP       --         Physical Exam    Nursing note and vitals reviewed.  Constitutional: She appears well-developed and well-nourished.   HENT:   Head: Normocephalic and atraumatic.   Right Ear: External ear normal.   Left Ear: External ear normal.   Eyes: Conjunctivae and EOM are normal. Pupils are equal, round, and reactive to light.   Neck: Neck supple.   Normal range of motion.  Cardiovascular:  Regular rhythm, normal heart sounds and intact distal pulses.   Tachycardia present.         Pulmonary/Chest: Breath sounds normal.   Abdominal: Abdomen is soft. Bowel sounds are normal.   Mild abdominal wall edema; incisions are clean and dry       Musculoskeletal:         General: Normal range of motion.      Cervical back: Normal range of motion and neck supple.      Right lower le+ Pitting Edema present.      Left lower le+ Pitting Edema present.     Neurological: She is alert and oriented to person, place, and time. GCS score is 15. GCS eye subscore is 4. GCS verbal subscore is 5. GCS motor subscore is 6.   Skin: Skin is warm and dry. Capillary refill takes less than 2 seconds.   Psychiatric: She has a normal mood and affect. Her behavior is normal. Judgment and thought content  normal.         ED Course   Procedures  Labs Reviewed   COMPREHENSIVE METABOLIC PANEL - Abnormal; Notable for the following components:       Result Value    Potassium Level 3.2 (*)     Albumin Level 2.5 (*)     Globulin 3.9 (*)     Albumin/Globulin Ratio 0.6 (*)     Alanine Aminotransferase 67 (*)     Aspartate Aminotransferase 53 (*)     All other components within normal limits   URINALYSIS, REFLEX TO URINE CULTURE - Abnormal; Notable for the following components:    Leukocyte Esterase, UA 75 (*)     Squamous Epithelial Cells, UA Occasional (*)     All other components within normal limits   CBC WITH DIFFERENTIAL - Abnormal; Notable for the following components:    RBC 3.13 (*)     Hgb 9.5 (*)     Hct 29.1 (*)     MCHC 32.6 (*)     IG# 0.18 (*)     All other components within normal limits   MAGNESIUM - Normal   CBC W/ AUTO DIFFERENTIAL    Narrative:     The following orders were created for panel order CBC auto differential.  Procedure                               Abnormality         Status                     ---------                               -----------         ------                     CBC with Differential[[id number]]        Abnormal            Final result                 Please view results for these tests on the individual orders.          Imaging Results    None          Medications   furosemide injection 40 mg (has no administration in time range)     Medical Decision Making  Amount and/or Complexity of Data Reviewed  Labs: ordered. Decision-making details documented in ED Course.    Differential includes:  LIA, hypoalbuminemia, dependent edema/trauma, nephrotic syndrome.  Will obtain CBC, CMP, UA, mag.  If renal function is OK, will give short course of Lasix.           ED Course as of 12/09/23 2334   Sat Dec 09, 2023   2331 Discussed results with patient.  Renal function is normal, albumin down, which is not surprising given recent surgery.  Also hypokalemic, so diuresis must be accompanied by  potassium supplementation.  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 [CL]      ED Course User Index  [CL] Bam Bernardo MD                           Clinical Impression:  Final diagnoses:  [R60.0] Peripheral edema (Primary)  [E88.09] Hypoalbuminemia  [E87.6] Hypokalemia          ED Disposition Condition    Discharge Stable          ED Prescriptions       Medication Sig Dispense Start Date End Date Auth. Provider    furosemide (LASIX) 40 MG tablet Take 1 tablet (40 mg total) by mouth once daily. for 7 days 7 tablet 12/9/2023 12/16/2023 Bam Bernardo MD    potassium chloride SA (K-DUR,KLOR-CON) 20 MEQ tablet Take 1 tablet (20 mEq total) by mouth 2 (two) times daily. for 10 days 20 tablet 12/9/2023 12/19/2023 Bam Bernardo MD          Follow-up Information       Follow up With Specialties Details Why Contact Info    Follow up with your primary MD in 3-5 days if not improved.  Return to ED for worsening symptoms.                 Bam Bernardo MD  12/09/23 0523

## 2023-12-11 RX ORDER — DEXTROAMPHETAMINE SACCHARATE, AMPHETAMINE ASPARTATE, DEXTROAMPHETAMINE SULFATE AND AMPHETAMINE SULFATE 7.5; 7.5; 7.5; 7.5 MG/1; MG/1; MG/1; MG/1
30 TABLET ORAL EVERY MORNING
Qty: 30 TABLET | Refills: 0 | Status: SHIPPED | OUTPATIENT
Start: 2023-12-11 | End: 2023-12-19 | Stop reason: SDUPTHER

## 2023-12-11 RX ORDER — PANTOPRAZOLE SODIUM 40 MG/1
40 TABLET, DELAYED RELEASE ORAL DAILY
Qty: 90 TABLET | Refills: 0 | Status: SHIPPED | OUTPATIENT
Start: 2023-12-11 | End: 2024-03-12 | Stop reason: SDUPTHER

## 2023-12-19 DIAGNOSIS — F90.0 ATTENTION DEFICIT HYPERACTIVITY DISORDER (ADHD), PREDOMINANTLY INATTENTIVE TYPE: Chronic | ICD-10-CM

## 2023-12-20 RX ORDER — DEXTROAMPHETAMINE SACCHARATE, AMPHETAMINE ASPARTATE, DEXTROAMPHETAMINE SULFATE AND AMPHETAMINE SULFATE 7.5; 7.5; 7.5; 7.5 MG/1; MG/1; MG/1; MG/1
30 TABLET ORAL EVERY MORNING
Qty: 30 TABLET | Refills: 0 | Status: SHIPPED | OUTPATIENT
Start: 2023-12-20 | End: 2024-01-16 | Stop reason: SDUPTHER

## 2024-01-10 DIAGNOSIS — F90.0 ATTENTION DEFICIT HYPERACTIVITY DISORDER (ADHD), PREDOMINANTLY INATTENTIVE TYPE: Chronic | ICD-10-CM

## 2024-01-10 RX ORDER — DEXTROAMPHETAMINE SACCHARATE, AMPHETAMINE ASPARTATE, DEXTROAMPHETAMINE SULFATE AND AMPHETAMINE SULFATE 7.5; 7.5; 7.5; 7.5 MG/1; MG/1; MG/1; MG/1
30 TABLET ORAL EVERY MORNING
Qty: 30 TABLET | Refills: 0 | OUTPATIENT
Start: 2024-01-10 | End: 2024-02-09

## 2024-01-10 NOTE — TELEPHONE ENCOUNTER
----- Message from Nia José sent at 1/10/2024  9:10 AM CST -----  .Type:  RX Refill Request    Who Called: pt  Refill or New Rx:refill  RX Name and Strength:dextroamphetamine-amphetamine  How is the patient currently taking it? (ex. 1XDay):1x day  Is this a 30 day or 90 day RX:30  Preferred Pharmacy with phone number:[facility]  Local or Mail Order:local  Ordering Provider:Alix  Would the patient rather a call back or a response via [facility]?   Best Call Back Number:[phone]  Additional Information: pt calling to check on her adderall

## 2024-01-16 ENCOUNTER — OFFICE VISIT (OUTPATIENT)
Dept: FAMILY MEDICINE | Facility: CLINIC | Age: 44
End: 2024-01-16
Payer: COMMERCIAL

## 2024-01-16 DIAGNOSIS — T78.40XA ALLERGY, INITIAL ENCOUNTER: ICD-10-CM

## 2024-01-16 DIAGNOSIS — E78.2 MIXED HYPERLIPIDEMIA: ICD-10-CM

## 2024-01-16 DIAGNOSIS — F90.2 ATTENTION DEFICIT HYPERACTIVITY DISORDER (ADHD), COMBINED TYPE: Primary | ICD-10-CM

## 2024-01-16 DIAGNOSIS — Z12.31 BREAST CANCER SCREENING BY MAMMOGRAM: ICD-10-CM

## 2024-01-16 DIAGNOSIS — I73.00 RAYNAUD'S DISEASE WITHOUT GANGRENE: Chronic | ICD-10-CM

## 2024-01-16 PROCEDURE — 1159F MED LIST DOCD IN RCRD: CPT | Mod: CPTII,95,, | Performed by: STUDENT IN AN ORGANIZED HEALTH CARE EDUCATION/TRAINING PROGRAM

## 2024-01-16 PROCEDURE — 99214 OFFICE O/P EST MOD 30 MIN: CPT | Mod: 95,,, | Performed by: STUDENT IN AN ORGANIZED HEALTH CARE EDUCATION/TRAINING PROGRAM

## 2024-01-16 PROCEDURE — 1160F RVW MEDS BY RX/DR IN RCRD: CPT | Mod: CPTII,95,, | Performed by: STUDENT IN AN ORGANIZED HEALTH CARE EDUCATION/TRAINING PROGRAM

## 2024-01-16 RX ORDER — DEXTROAMPHETAMINE SACCHARATE, AMPHETAMINE ASPARTATE, DEXTROAMPHETAMINE SULFATE AND AMPHETAMINE SULFATE 7.5; 7.5; 7.5; 7.5 MG/1; MG/1; MG/1; MG/1
30 TABLET ORAL EVERY MORNING
Qty: 30 TABLET | Refills: 0 | Status: SHIPPED | OUTPATIENT
Start: 2024-01-16 | End: 2024-01-16 | Stop reason: SDUPTHER

## 2024-01-16 RX ORDER — AMLODIPINE BESYLATE 5 MG/1
5 TABLET ORAL DAILY
Qty: 90 TABLET | Refills: 3 | Status: SHIPPED | OUTPATIENT
Start: 2024-01-16 | End: 2024-03-12 | Stop reason: SDUPTHER

## 2024-01-16 RX ORDER — DEXTROAMPHETAMINE SACCHARATE, AMPHETAMINE ASPARTATE, DEXTROAMPHETAMINE SULFATE AND AMPHETAMINE SULFATE 7.5; 7.5; 7.5; 7.5 MG/1; MG/1; MG/1; MG/1
30 TABLET ORAL EVERY MORNING
Qty: 30 TABLET | Refills: 0 | Status: SHIPPED | OUTPATIENT
Start: 2024-01-16 | End: 2024-03-12 | Stop reason: SDUPTHER

## 2024-01-16 RX ORDER — CETIRIZINE HYDROCHLORIDE 10 MG/1
10 TABLET ORAL DAILY
Qty: 30 TABLET | Refills: 0 | Status: SHIPPED | OUTPATIENT
Start: 2024-01-16 | End: 2024-03-12

## 2024-01-16 RX ORDER — ATORVASTATIN CALCIUM 20 MG/1
20 TABLET, FILM COATED ORAL DAILY
Qty: 90 TABLET | Refills: 3 | Status: SHIPPED | OUTPATIENT
Start: 2024-01-16 | End: 2024-03-12 | Stop reason: SDUPTHER

## 2024-01-16 NOTE — PROGRESS NOTES
Patient ID: [id number]     Chief Complaint: Follow-up and ADHD      HPI:     This is a telemedicine note. Patient was treated using telemedicine, real time audio and video, according to [facility] protocols. I, Amparo Thomson MD, conducted the visit from the [facility]. The patient participated in the visit at a non-[facility] location selected by the patient, identified below. I am licensed in the state where the patient stated they are located. The patient stated that they understood and accepted the privacy and security risks to their information at their location. This visit is not recorded.    Patient was located at the patient's home.       Fadia Wilks is a 43 y.o. female here today for a telemedicine visit.   ADHD  Patient would like to resume Adderall  She recently underwent Breast reduction surgery and held her Adderall. As of now healing well. Denies of any complications      Allergies-  Would like to refill cetrizine      HTN/Raynaud's Disease  Currently on Norvasc 5 mg but has been not consistent with a medication     Hyperlipidemia  Would like to get a refill for Lipitor         ----------------------------  Attention deficit hyperactivity disorder (ADHD), predominantly   inattentive type  Gastroesophageal reflux disease  Generalized anxiety disorder  Major depressive disorder  Obesity  Raynaud's disease  Seasonal allergic rhinitis     Past Surgical History:   Procedure Laterality Date    abdominal plasty      BREAST BIOPSY  06/04/2019    HYSTERECTOMY  06/13/2019    lyposuction      REDUCTION OF BOTH BREASTS Bilateral        Review of patient's allergies indicates:  No Known Allergies    Current Outpatient Medications   Medication Instructions    albuterol (PROVENTIL/VENTOLIN HFA) 90 mcg/actuation inhaler 2 puffs, Inhalation, Every 4 hours PRN    ALPRAZolam (XANAX) 0.5 mg, Oral, 2 times daily    amLODIPine (NORVASC) 5 mg, Oral, Daily    atorvastatin (LIPITOR) 20 mg, Oral, Daily    cetirizine  (ZYRTEC) 10 mg, Oral, Daily    cyclobenzaprine (FLEXERIL) 10 mg, Oral, 3 times daily PRN    dextroamphetamine-amphetamine 30 mg Tab 30 mg, Oral, Every morning    furosemide (LASIX) 40 mg, Oral, Daily    loratadine (CLARITIN) 10 mg, Oral, Nightly PRN    meloxicam (MOBIC) 15 mg, Oral, Daily    pantoprazole (PROTONIX) 40 mg, Oral, Daily       Social History     Socioeconomic History    Marital status: Unknown   Tobacco Use    Smoking status: Never     Passive exposure: Never    Smokeless tobacco: Never   Substance and Sexual Activity    Alcohol use: Yes     Alcohol/week: 10.0 standard drinks of alcohol     Types: 10 Glasses of wine per week     Comment: Socially or to unwind    Drug use: Never    Sexual activity: Yes     Partners: Male     Birth control/protection: See Surgical Hx     Social Determinants of Health     Financial Resource Strain: Low Risk  (1/16/2024)    Overall Financial Resource Strain (CARDIA)     Difficulty of Paying Living Expenses: Not hard at all   Food Insecurity: No Food Insecurity (1/16/2024)    Hunger Vital Sign     Worried About Running Out of Food in the Last Year: Never true     Ran Out of Food in the Last Year: Never true   Transportation Needs: No Transportation Needs (1/16/2024)    PRAPARE - Transportation     Lack of Transportation (Medical): No     Lack of Transportation (Non-Medical): No   Physical Activity: Unknown (1/16/2024)    Exercise Vital Sign     Days of Exercise per Week: 0 days   Stress: No Stress Concern Present (1/16/2024)    [employer] [place] [employer] - Occupational Stress Questionnaire     Feeling of Stress : Not at all   Social Connections: Unknown (1/16/2024)    Social Connection and Isolation Panel [NHANES]     Frequency of Communication with Friends and Family: More than three times a week     Frequency of Social Gatherings with Friends and Family: Three times a week     Active Member of Clubs or Organizations: Yes     Attends Club or Organization  Meetings: Patient declined     Marital Status:    Housing Stability: Low Risk  (1/16/2024)    Housing Stability Vital Sign     Unable to Pay for Housing in the Last Year: No     Number of Places Lived in the Last Year: 1     Unstable Housing in the Last Year: No        Family History   Problem Relation Age of Onset    Coronary artery disease Mother     Heart attack Mother 40    COPD Mother     Depression Mother     Diabetes Mother     Heart disease Mother     Hyperlipidemia Mother     Mental illness Mother         Bipolar    Coronary artery disease Father     COPD Father     Heart disease Father     Hyperlipidemia Father     Hypertension Father     Kidney disease Father     Cancer Maternal Aunt     Depression Brother     Mental illness Brother     Depression Sister     Depression Brother     Diabetes Brother     Early death Brother         Covid age 45    Diabetes Brother     Drug abuse Sister         Patient Care Team:  Amparo Thomson MD as PCP - General (Family Medicine)  Amparo Thomson MD as PCP - Family Medicine (Family Medicine)  Penny Figueroa MD (Obstetrics and Gynecology)      Subjective:       ROS    See HPI for details    Constitutional: Denies Change in appetite. Denies Chills. Denies Fever. Denies Night sweats.  Eye: Denies Blurred vision. Denies Discharge. Denies Eye pain.  ENT: Denies Decreased hearing. Denies Sore throat. Denies Swollen glands.  Respiratory: Denies Cough. Denies Shortness of breath. Denies Shortness of breath with exertion. Denies Wheezing.  Cardiovascular: DeniesChest pain at rest. Denies Chest pain with exertion. Denies Irregular heartbeat. Denies Palpitations. Denies Edema.  Gastrointestinal: Denies Abdominal pain. DeniesDiarrhea. Denies Nausea. Denies Vomiting. Denies Hematemesis or Hematochezia.  Genitourinary: Denies Dysuria. Denies Urinary frequency. Denies Urinary urgency. Denies Blood in urine.  Endocrine: Denies Cold intolerance. Denies Excessive thirst.  Denies Heat intolerance. Denies Weight loss. Denies Weight gain.  Musculoskeletal: Denies Painful joints. Denies Weakness.  Integumentary: Denies Rash. Denies Itching. Denies Dry skin.  Neurologic: Denies Dizziness. Denies Fainting. Denies Headache.  Psychiatric: Denies Depression. Denies Anxiety. Denies Suicidal/Homicidal ideations.    All Other ROS: Negative except as stated in HPI.       Objective:     There were no vitals taken for this visit.    Physical Exam    Physical Exam: LIMITED DUE TO TELEMEDICINE RESTRICTIONS.  General: Alert and oriented, No acute distress.  Head: Normocephalic, Atraumatic.  Eye: Sclera non-icteric.  Neck/Thyroid:  Full range of motion.  Respiratory: Non-labored respirations, Symmetrical chest wall expansion.  Musculoskeletal: Normal range of motion.  Integumentary:  No visible suspicious lesions or rashes. No diaphoresis.   Neurologic: No focal deficits  Psychiatric: Normal interaction, Coherent speech, Euthymic mood, Appropriate affect       Assessment:       ICD-10-CM ICD-9-CM   1. Attention deficit hyperactivity disorder (ADHD), combined type  F90.2 314.01   2. Breast cancer screening by mammogram  Z12.31 V76.12   3. Allergy, initial encounter  T78.40XA 995.3   4. Mixed hyperlipidemia  E78.2 272.2   5. Raynaud's disease without gangrene  I73.00 443.0        Plan:     1. Attention deficit hyperactivity disorder (ADHD), combined type  dextroamphetamine-amphetamine 30 mg Tab; Take 1 tablet (30 mg total) by mouth every morning.  Dispense: 30 tablet; Refill: 0    2. Breast cancer screening by mammogram  -     Mammo Digital Screening Bilat w/ Pratik; Future; Expected date: 02/07/2024    3. Allergy, initial encounter  -     cetirizine (ZYRTEC) 10 MG tablet; Take 1 tablet (10 mg total) by mouth once daily.  Dispense: 30 tablet; Refill: 0    4. Mixed hyperlipidemia  -     atorvastatin (LIPITOR) 20 MG tablet; Take 1 tablet (20 mg total) by mouth once daily.  Dispense: 90 tablet; Refill:  3    5. Raynaud's disease without gangrene  -     amLODIPine (NORVASC) 5 MG tablet; Take 1 tablet (5 mg total) by mouth Daily.  Dispense: 90 tablet; Refill: 3           Medication List with Changes/Refills   Current Medications    ALBUTEROL (PROVENTIL/VENTOLIN HFA) 90 MCG/ACTUATION INHALER    Inhale 2 puffs into the lungs every 4 (four) hours as needed.       Start Date: 10/14/2021End Date: --    ALPRAZOLAM (XANAX) 0.5 MG TABLET    Take 1 tablet (0.5 mg total) by mouth 2 (two) times daily.       Start Date: 8/4/2022  End Date: --    AMLODIPINE (NORVASC) 5 MG TABLET    Take 1 tablet (5 mg total) by mouth Daily.       Start Date: 5/11/2023 End Date: --    ATORVASTATIN (LIPITOR) 20 MG TABLET    Take 1 tablet (20 mg total) by mouth once daily.       Start Date: 5/11/2023 End Date: 5/10/2024    CETIRIZINE (ZYRTEC) 10 MG TABLET    Take 1 tablet (10 mg total) by mouth once daily.       Start Date: 2/13/2023 End Date: 2/13/2024    CYCLOBENZAPRINE (FLEXERIL) 10 MG TABLET    Take 1 tablet (10 mg total) by mouth 3 (three) times daily as needed for Muscle spasms.       Start Date: 10/16/2023End Date: --    DEXTROAMPHETAMINE-AMPHETAMINE 30 MG TAB    Take 1 tablet (30 mg total) by mouth every morning.       Start Date: 12/20/2023End Date: 1/19/2024    FUROSEMIDE (LASIX) 40 MG TABLET    Take 1 tablet (40 mg total) by mouth once daily. for 7 days       Start Date: 12/9/2023 End Date: 12/16/2023    LORATADINE (CLARITIN) 10 MG TABLET    Take 1 tablet (10 mg total) by mouth nightly as needed for Allergies.       Start Date: 10/11/2022End Date: --    MELOXICAM (MOBIC) 15 MG TABLET    Take 1 tablet (15 mg total) by mouth once daily.       Start Date: 10/16/2023End Date: --    PANTOPRAZOLE (PROTONIX) 40 MG TABLET    Take 1 tablet (40 mg total) by mouth once daily.       Start Date: 12/11/2023End Date: 3/10/2024          Follow up for Annual Wellness- Keep scheduled. In addition to their scheduled follow up, the patient has also been  instructed to follow up on as needed basis.       Video Time Documentation:  Spent 10 minutes with patient face to face discussed health concerns. More than 50% of this time was spent in counseling and coordination of care.  Answers submitted by the patient for this visit:  Review of Systems Questionnaire (Submitted on 1/16/2024)  activity change: Yes  unexpected weight change: No  rhinorrhea: Yes  trouble swallowing: No  visual disturbance: No  chest tightness: No  polyuria: No  difficulty urinating: No  menstrual problem: No  joint swelling: No  arthralgias: No  confusion: No  dysphoric mood: No

## 2024-01-26 LAB — NONINV COLON CA DNA+OCC BLD SCRN STL QL: NEGATIVE

## 2024-01-29 ENCOUNTER — PATIENT MESSAGE (OUTPATIENT)
Dept: FAMILY MEDICINE | Facility: CLINIC | Age: 44
End: 2024-01-29
Payer: COMMERCIAL

## 2024-03-06 ENCOUNTER — TELEPHONE (OUTPATIENT)
Dept: FAMILY MEDICINE | Facility: CLINIC | Age: 44
End: 2024-03-06
Payer: COMMERCIAL

## 2024-03-06 DIAGNOSIS — Z00.00 WELLNESS EXAMINATION: Primary | ICD-10-CM

## 2024-03-06 NOTE — TELEPHONE ENCOUNTER
----- Message from Susie Braden sent at 3/6/2024  1:45 PM CST -----  Regarding: call back  .Type:  Needs Medical Advice    Who Called: pat  Would the patient rather a call back or a response via [facility]? bang  Best Call Back Number: [phone]  Additional Information: pt states she has called multiple times for her order to be sent at [facility] she is currently still there waiting . Attempted back line several times pls call pt when done fax is [phone].

## 2024-03-07 LAB
ALBUMIN SERPL-MCNC: 4.8 G/DL (ref 3.9–4.9)
ALBUMIN/GLOB SERPL: 1.4 {RATIO} (ref 1.2–2.2)
ALP SERPL-CCNC: 80 IU/L (ref 44–121)
ALT SERPL-CCNC: 11 IU/L (ref 0–32)
APPEARANCE UR: CLEAR
AST SERPL-CCNC: 14 IU/L (ref 0–40)
BACTERIA #/AREA URNS HPF: ABNORMAL /[HPF]
BASOPHILS # BLD AUTO: 0 X10E3/UL (ref 0–0.2)
BASOPHILS NFR BLD AUTO: 0 %
BILIRUB SERPL-MCNC: 0.5 MG/DL (ref 0–1.2)
BILIRUB UR QL STRIP: NEGATIVE
BUN SERPL-MCNC: 11 MG/DL (ref 6–24)
BUN/CREAT SERPL: 16 (ref 9–23)
CALCIUM SERPL-MCNC: 9.9 MG/DL (ref 8.7–10.2)
CHLORIDE SERPL-SCNC: 101 MMOL/L (ref 96–106)
CHOLEST SERPL-MCNC: 191 MG/DL (ref 100–199)
CO2 SERPL-SCNC: 20 MMOL/L (ref 20–29)
COLOR UR: YELLOW
CREAT SERPL-MCNC: 0.69 MG/DL (ref 0.57–1)
CRYSTALS URNS MICRO: ABNORMAL
EOSINOPHIL # BLD AUTO: 0 X10E3/UL (ref 0–0.4)
EOSINOPHIL NFR BLD AUTO: 0 %
EPI CELLS #/AREA URNS HPF: ABNORMAL /HPF (ref 0–10)
ERYTHROCYTE [DISTWIDTH] IN BLOOD BY AUTOMATED COUNT: 12.7 % (ref 11.7–15.4)
EST. GFR  (NO RACE VARIABLE): 109 ML/MIN/1.73
GLOBULIN SER CALC-MCNC: 3.4 G/DL (ref 1.5–4.5)
GLUCOSE SERPL-MCNC: 85 MG/DL (ref 70–99)
GLUCOSE UR QL STRIP: NEGATIVE
HBA1C MFR BLD: 5.3 % (ref 4.8–5.6)
HCT VFR BLD AUTO: 44.7 % (ref 34–46.6)
HDLC SERPL-MCNC: 57 MG/DL
HGB BLD-MCNC: 14.7 G/DL (ref 11.1–15.9)
HGB UR QL STRIP: NEGATIVE
IMM GRANULOCYTES NFR BLD AUTO: 0 %
KETONES UR QL STRIP: ABNORMAL
LDLC SERPL CALC-MCNC: 119 MG/DL (ref 0–99)
LEUKOCYTE ESTERASE UR QL STRIP: NEGATIVE
LYMPHOCYTES # BLD AUTO: 2.7 X10E3/UL (ref 0.7–3.1)
LYMPHOCYTES NFR BLD AUTO: 29 %
MCH RBC QN AUTO: 30 PG (ref 26.6–33)
MCHC RBC AUTO-ENTMCNC: 32.9 G/DL (ref 31.5–35.7)
MCV RBC AUTO: 91 FL (ref 79–97)
MICRO URNS: ABNORMAL
MICRO URNS: ABNORMAL
MONOCYTES # BLD AUTO: 0.5 X10E3/UL (ref 0.1–0.9)
MONOCYTES NFR BLD AUTO: 6 %
MUCOUS THREADS URNS QL MICRO: PRESENT
NEUTROPHILS # BLD AUTO: 6.1 X10E3/UL (ref 1.4–7)
NEUTROPHILS NFR BLD AUTO: 65 %
NITRITE UR QL STRIP: NEGATIVE
PH UR STRIP: 5 [PH] (ref 5–7.5)
PLATELET # BLD AUTO: 356 X10E3/UL (ref 150–450)
POTASSIUM SERPL-SCNC: 4.3 MMOL/L (ref 3.5–5.2)
PROT SERPL-MCNC: 8.2 G/DL (ref 6–8.5)
PROT UR QL STRIP: NEGATIVE
RBC # BLD AUTO: 4.9 X10E6/UL (ref 3.77–5.28)
RBC #/AREA URNS HPF: ABNORMAL /HPF (ref 0–2)
SODIUM SERPL-SCNC: 140 MMOL/L (ref 134–144)
SP GR UR STRIP: 1.03 (ref 1–1.03)
SPECIMEN STATUS REPORT: NORMAL
TRIGL SERPL-MCNC: 85 MG/DL (ref 0–149)
TSH SERPL DL<=0.005 MIU/L-ACNC: 1.86 UIU/ML (ref 0.45–4.5)
URINALYSIS REFLEX: ABNORMAL
UROBILINOGEN UR STRIP-MCNC: 0.2 MG/DL (ref 0.2–1)
VLDLC SERPL CALC-MCNC: 15 MG/DL (ref 5–40)
WBC # BLD AUTO: 9.4 X10E3/UL (ref 3.4–10.8)
WBC #/AREA URNS HPF: ABNORMAL /HPF (ref 0–5)

## 2024-03-12 ENCOUNTER — OFFICE VISIT (OUTPATIENT)
Dept: FAMILY MEDICINE | Facility: CLINIC | Age: 44
End: 2024-03-12
Payer: COMMERCIAL

## 2024-03-12 VITALS
WEIGHT: 169.88 LBS | HEIGHT: 62 IN | BODY MASS INDEX: 31.26 KG/M2 | DIASTOLIC BLOOD PRESSURE: 80 MMHG | SYSTOLIC BLOOD PRESSURE: 120 MMHG | OXYGEN SATURATION: 99 % | RESPIRATION RATE: 17 BRPM | TEMPERATURE: 98 F | HEART RATE: 96 BPM

## 2024-03-12 DIAGNOSIS — I10 BENIGN ESSENTIAL HTN: ICD-10-CM

## 2024-03-12 DIAGNOSIS — Z00.00 WELL ADULT HEALTH CHECK: ICD-10-CM

## 2024-03-12 DIAGNOSIS — F33.1 MODERATE EPISODE OF RECURRENT MAJOR DEPRESSIVE DISORDER: Chronic | ICD-10-CM

## 2024-03-12 DIAGNOSIS — F41.1 GENERALIZED ANXIETY DISORDER: Chronic | ICD-10-CM

## 2024-03-12 DIAGNOSIS — Z00.00 WELLNESS EXAMINATION: ICD-10-CM

## 2024-03-12 DIAGNOSIS — K21.9 GASTROESOPHAGEAL REFLUX DISEASE WITHOUT ESOPHAGITIS: Chronic | ICD-10-CM

## 2024-03-12 DIAGNOSIS — F90.0 ATTENTION DEFICIT HYPERACTIVITY DISORDER (ADHD), PREDOMINANTLY INATTENTIVE TYPE: Chronic | ICD-10-CM

## 2024-03-12 DIAGNOSIS — I73.00 RAYNAUD'S DISEASE WITHOUT GANGRENE: Chronic | ICD-10-CM

## 2024-03-12 DIAGNOSIS — E66.09 CLASS 1 OBESITY DUE TO EXCESS CALORIES WITHOUT SERIOUS COMORBIDITY WITH BODY MASS INDEX (BMI) OF 31.0 TO 31.9 IN ADULT: ICD-10-CM

## 2024-03-12 DIAGNOSIS — E78.2 MIXED HYPERLIPIDEMIA: ICD-10-CM

## 2024-03-12 DIAGNOSIS — T78.40XA ALLERGY, INITIAL ENCOUNTER: ICD-10-CM

## 2024-03-12 PROCEDURE — 3008F BODY MASS INDEX DOCD: CPT | Mod: CPTII,,, | Performed by: STUDENT IN AN ORGANIZED HEALTH CARE EDUCATION/TRAINING PROGRAM

## 2024-03-12 PROCEDURE — 3079F DIAST BP 80-89 MM HG: CPT | Mod: CPTII,,, | Performed by: STUDENT IN AN ORGANIZED HEALTH CARE EDUCATION/TRAINING PROGRAM

## 2024-03-12 PROCEDURE — 99213 OFFICE O/P EST LOW 20 MIN: CPT | Mod: 25,,, | Performed by: STUDENT IN AN ORGANIZED HEALTH CARE EDUCATION/TRAINING PROGRAM

## 2024-03-12 PROCEDURE — 1160F RVW MEDS BY RX/DR IN RCRD: CPT | Mod: CPTII,,, | Performed by: STUDENT IN AN ORGANIZED HEALTH CARE EDUCATION/TRAINING PROGRAM

## 2024-03-12 PROCEDURE — 1159F MED LIST DOCD IN RCRD: CPT | Mod: CPTII,,, | Performed by: STUDENT IN AN ORGANIZED HEALTH CARE EDUCATION/TRAINING PROGRAM

## 2024-03-12 PROCEDURE — 3074F SYST BP LT 130 MM HG: CPT | Mod: CPTII,,, | Performed by: STUDENT IN AN ORGANIZED HEALTH CARE EDUCATION/TRAINING PROGRAM

## 2024-03-12 PROCEDURE — 99396 PREV VISIT EST AGE 40-64: CPT | Mod: ,,, | Performed by: STUDENT IN AN ORGANIZED HEALTH CARE EDUCATION/TRAINING PROGRAM

## 2024-03-12 PROCEDURE — 3044F HG A1C LEVEL LT 7.0%: CPT | Mod: CPTII,,, | Performed by: STUDENT IN AN ORGANIZED HEALTH CARE EDUCATION/TRAINING PROGRAM

## 2024-03-12 RX ORDER — LORATADINE 10 MG/1
10 TABLET ORAL NIGHTLY PRN
Qty: 90 TABLET | Refills: 3 | Status: SHIPPED | OUTPATIENT
Start: 2024-03-12

## 2024-03-12 RX ORDER — ATORVASTATIN CALCIUM 20 MG/1
20 TABLET, FILM COATED ORAL DAILY
Qty: 90 TABLET | Refills: 3 | Status: SHIPPED | OUTPATIENT
Start: 2024-03-12 | End: 2025-03-12

## 2024-03-12 RX ORDER — DEXTROAMPHETAMINE SACCHARATE, AMPHETAMINE ASPARTATE, DEXTROAMPHETAMINE SULFATE AND AMPHETAMINE SULFATE 7.5; 7.5; 7.5; 7.5 MG/1; MG/1; MG/1; MG/1
30 TABLET ORAL EVERY MORNING
Qty: 30 TABLET | Refills: 0 | Status: SHIPPED | OUTPATIENT
Start: 2024-03-12 | End: 2024-04-16 | Stop reason: SDUPTHER

## 2024-03-12 RX ORDER — AMLODIPINE BESYLATE 5 MG/1
5 TABLET ORAL DAILY
Qty: 90 EACH | Refills: 3 | Status: SHIPPED | OUTPATIENT
Start: 2024-03-12

## 2024-03-12 RX ORDER — PANTOPRAZOLE SODIUM 40 MG/1
40 TABLET, DELAYED RELEASE ORAL DAILY
Qty: 90 TABLET | Refills: 0 | Status: SHIPPED | OUTPATIENT
Start: 2024-03-12 | End: 2024-06-14 | Stop reason: SDUPTHER

## 2024-03-12 NOTE — PROGRESS NOTES
Patient ID: [id number]     Chief Complaint: Annual Exam      HPI:      Disclaimer:  This note is prepared using voice recognition software and as such is likely to have errors despite attempts at proofreading. Please contact me for questions.    Fadia Wilks is a 43 y.o. female here today for an annual wellness visit.        Acute Issues-  S/p Breast reduction and Liposuction with Stanley anderson by Dr. Roberts     Chronic     Attention deficit hyperactivity disorder (ADHD), predominantly   Well controlled on Adderall 30 mg XR.  The patient presents today for follow-up and reevaluation of current ADD.  The patient reports good efficacy regarding productivity and concentration. The patient reports 100% compliance with the medication regimen. The patient reports no present or intolerable side effects to the medication regimen. The patient reports no change in primary pharmacy since the last documented visit with our office.     Neck muscle spasm-  resolved     Anxiety-   On Prn Xanax.  Was on opioids because surgery but is off of opioids as of now  Well controlled     Raynaud's-   HTN   Taking Amlodipine  stable     Allergies-  On Claritin  And Prn albuterol     Depression-  Resolved  Denies Si/Hi    Acid reflux  On prn Protonix    HLD  On Lipitor  Past Surgical History:   Procedure Laterality Date    abdominal plasty      BREAST BIOPSY  06/04/2019    BREAST SURGERY  12/4/2023    Breast reduction    COSMETIC SURGERY  12/4/2023    Liposuction 360 and abdominoplasty    HYSTERECTOMY  06/13/2019    lyposuction      REDUCTION OF BOTH BREASTS Bilateral        Review of patient's allergies indicates:  No Known Allergies    Current Outpatient Medications   Medication Instructions    albuterol (PROVENTIL/VENTOLIN HFA) 90 mcg/actuation inhaler 2 puffs, Inhalation, Every 4 hours PRN    ALPRAZolam (XANAX) 0.5 mg, Oral, 2 times daily    amLODIPine (NORVASC) 5 mg, Oral, Daily    atorvastatin (LIPITOR) 20 mg, Oral, Daily    cetirizine  (ZYRTEC) 10 mg, Oral, Daily    cyclobenzaprine (FLEXERIL) 10 mg, Oral, 3 times daily PRN    dextroamphetamine-amphetamine 30 mg Tab 30 mg, Oral, Every morning    furosemide (LASIX) 40 mg, Oral, Daily    loratadine (CLARITIN) 10 mg, Oral, Nightly PRN    meloxicam (MOBIC) 15 mg, Oral, Daily    pantoprazole (PROTONIX) 40 mg, Oral, Daily       Social History     Socioeconomic History    Marital status: Unknown   Tobacco Use    Smoking status: Never     Passive exposure: Never    Smokeless tobacco: Never   Substance and Sexual Activity    Alcohol use: Yes     Alcohol/week: 10.0 standard drinks of alcohol     Types: 10 Glasses of wine per week     Comment: Socially or to unwind    Drug use: Never    Sexual activity: Yes     Partners: Male     Birth control/protection: See Surgical Hx     Social Determinants of Health     Financial Resource Strain: Low Risk  (1/16/2024)    Overall Financial Resource Strain (CARDIA)     Difficulty of Paying Living Expenses: Not hard at all   Food Insecurity: No Food Insecurity (1/16/2024)    Hunger Vital Sign     Worried About Running Out of Food in the Last Year: Never true     Ran Out of Food in the Last Year: Never true   Transportation Needs: No Transportation Needs (1/16/2024)    PRAPARE - Transportation     Lack of Transportation (Medical): No     Lack of Transportation (Non-Medical): No   Physical Activity: Unknown (1/16/2024)    Exercise Vital Sign     Days of Exercise per Week: 0 days   Stress: No Stress Concern Present (1/16/2024)    [employer] - Occupational Stress Questionnaire     Feeling of Stress : Not at all   Social Connections: Unknown (1/16/2024)    Social Connection and Isolation Panel [NHANES]     Frequency of Communication with Friends and Family: More than three times a week     Frequency of Social Gatherings with Friends and Family: Three times a week     Active Member of Clubs or Organizations: Yes     Attends Club or Organization  Meetings: Patient declined     Marital Status:    Housing Stability: Low Risk  (1/16/2024)    Housing Stability Vital Sign     Unable to Pay for Housing in the Last Year: No     Number of Places Lived in the Last Year: 1     Unstable Housing in the Last Year: No        Family History   Problem Relation Age of Onset    Coronary artery disease Mother     Heart attack Mother 40    COPD Mother     Depression Mother     Diabetes Mother     Heart disease Mother     Hyperlipidemia Mother     Mental illness Mother         Bipolar    Coronary artery disease Father     COPD Father     Heart disease Father     Hyperlipidemia Father     Hypertension Father     Kidney disease Father     Alcohol abuse Father     Stroke Father     Cancer Maternal Aunt     Depression Brother     Mental illness Brother     Drug abuse Brother     Depression Sister     Depression Brother     Diabetes Brother     Early death Brother         Covid age 45    Diabetes Brother     Drug abuse Sister     Alcohol abuse Sister     Mental illness Sister     Stroke Sister         Patient Care Team:  Amparo Thomson MD as PCP - General (Family Medicine)  Amparo Thomson MD as PCP - Family Medicine (Family Medicine)  Penny Figueroa MD (Obstetrics and Gynecology)       Subjective:     ROS    See HPI for details    Constitutional: Denies Change in appetite. Denies Chills. Denies Fever. Denies Night sweats.  Respiratory: Denies Cough. Denies Shortness of breath. Denies Shortness of breath with exertion. Denies Wheezing.  Cardiovascular: DeniesChest pain at rest. Denies Chest pain with exertion. Denies Irregular heartbeat. Denies Palpitations. Denies Edema.  Gastrointestinal: Denies Abdominal pain. DeniesDiarrhea. Denies Nausea. Denies Vomiting. Denies Hematemesis or Hematochezia.  Genitourinary: Denies Dysuria. Denies Urinary frequency. Denies Urinary urgency. Denies Blood in urine.  Endocrine: Denies Cold intolerance. Denies Excessive thirst. Denies  "Heat intolerance. Denies Weight loss. Denies Weight gain.  Musculoskeletal: Denies Painful joints. Denies Weakness.  Integumentary: Denies Rash. Denies Itching. Denies Dry skin.  Neurologic: Denies Dizziness. Denies Fainting. Denies Headache.  Psychiatric: Denies Depression. Denies Anxiety. Denies Suicidal/Homicidal ideations.    All Other ROS: Negative except as stated in HPI.       Objective:     Visit Vitals  /80 (BP Location: Right arm, Patient Position: Sitting, BP Method: Large (Manual))   Pulse 96   Temp 98.2 °F (36.8 °C) (Oral)   Resp 17   Ht 5' 2" (1.575 m)   Wt 77.1 kg (169 lb 14.4 oz)   SpO2 99%   BMI 31.08 kg/m²       Physical Exam    General: Alert and oriented, obese, Obese,  No acute distress.  Neck/Thyroid: Supple, Non-tender  Respiratory: Clear to auscultation bilaterally; No wheezes  Cardiovascular: Regular rate and rhythm  Gastrointestinal: Soft, Non-tender,No palpable organomegaly.  Musculoskeletal: Normal range of motion.  Neurologic: No focal deficits  Psychiatric: Normal interaction, Coherent speech, Euthymic mood, Appropriate affect       Assessment:       ICD-10-CM ICD-9-CM   1. Wellness examination  Z00.00 V70.0   2. Attention deficit hyperactivity disorder (ADHD), predominantly inattentive type  F90.0 314.00   3. Generalized anxiety disorder  F41.1 300.02   4. Moderate episode of recurrent major depressive disorder  F33.1 296.32   5. Raynaud's disease without gangrene  I73.00 443.0   6. Mixed hyperlipidemia  E78.2 272.2   7. Benign essential HTN  I10 401.1   8. Class 1 obesity due to excess calories without serious comorbidity with body mass index (BMI) of 31.0 to 31.9 in adult  E66.09 278.00    Z68.31 V85.31   9. Gastroesophageal reflux disease without esophagitis  K21.9 530.81   10. Allergy, initial encounter  T78.40XA 995.3   11. Well adult health check  Z00.00 V70.0        Plan:         Health Maintenance Topics with due status: Not Due       Topic Last Completion Date    TETANUS " VACCINE 08/09/2017    Colorectal Cancer Screening 01/17/2024    Hemoglobin A1c (Diabetic Prevention Screening) 03/06/2024    Lipid Panel 03/06/2024       Vaccinations -   Immunization History   Administered Date(s) Administered    Influenza - Trivalent - PF (ADULT) 12/23/2014, 10/25/2017, 09/10/2018, 09/24/2019, 10/13/2020, 10/14/2021    MMR 11/09/2017    Tdap 08/09/2017        1. Wellness examination  Cervical Cancer Screening - S/p hysterectomy  Breast Cancer Screening - Last Mammogram on Normal. Follow up in 1 year  Colon Cancer Screening - [place] guard negative, next due in 2027  Eye Exam - Recommend annually.  Dental Exam - Recommend biannual exams.     Diet discussed (healthy food choices, reduce portions and overall calorie intake)  Exercise 30-45 minutes 5x per week  Avoid excessive alcohol and tobacco if taking  Immunizations dicussed  Monthly breast exam recommended  Preventative exams discussed.        2. Attention deficit hyperactivity disorder (ADHD), predominantly inattentive type   reviewed  Discussed about teratogenicity, patient voiced understanding   Drug contract signed today  Status post ELMIRA   Drug Screen, Urine  dextroamphetamine-amphetamine 30 mg Tab; Take 1 tablet (30 mg total) by mouth every morning.  Dispense: 30 tablet; Refill: 0    3. Generalized anxiety disorder  4. Moderate episode of recurrent major depressive disorder  PHQ-9 6  Declined for medications.  May consider in future  -     Ambulatory referral/consult to Behavioral Health; Future; Expected date: 03/19/2024    5. Raynaud's disease without gangrene  -     amLODIPine (NORVASC) 5 MG tablet; Take 1 tablet (5 mg total) by mouth Daily.  Dispense: 90 each; Refill: 3    6. Gastroesophageal reflux disease without esophagitis  -     pantoprazole (PROTONIX) 40 MG tablet; Take 1 tablet (40 mg total) by mouth once daily.  Dispense: 90 tablet; Refill: 0    7. Class 1 obesity due to excess calories without serious comorbidity with body mass  index (BMI) of 31.0 to 31.9 in adult  Body mass index is 31.08 kg/m².  Goal BMI <30.  Exercise 5 times a week for 30 minutes per day.  Avoid soda, simple sugars, excessive rice, potatoes or bread. Limit fast foods and fried foods.  Choose complex carbs in moderation (example: green vegetables, beans, oatmeal). Eat plenty of fresh fruits and vegetables with lean meats daily.  Do not skip meals. Eat a balanced portion size.  Avoid fad diets. Consider permanent healthy life style changes.     8. Mixed hyperlipidemia  -     atorvastatin (LIPITOR) 20 MG tablet; Take 1 tablet (20 mg total) by mouth once daily.  Dispense: 90 tablet; Refill: 3    9. Benign essential HTN  Blood pressure at goal  Continue amlodipine as prescribed   Avoid salty food   Continue dash diet with 35 minutes of brisk walking    10. Allergy, initial encounter  -     loratadine (CLARITIN) 10 mg tablet; Take 1 tablet (10 mg total) by mouth nightly as needed for Allergies.  Dispense: 90 tablet; Refill: 3         Medication List with Changes/Refills   Current Medications    ALBUTEROL (PROVENTIL/VENTOLIN HFA) 90 MCG/ACTUATION INHALER    Inhale 2 puffs into the lungs every 4 (four) hours as needed.       Start Date: 10/14/2021End Date: --    ALPRAZOLAM (XANAX) 0.5 MG TABLET    Take 1 tablet (0.5 mg total) by mouth 2 (two) times daily.       Start Date: 8/4/2022  End Date: --    AMLODIPINE (NORVASC) 5 MG TABLET    Take 1 tablet (5 mg total) by mouth Daily.       Start Date: 1/16/2024 End Date: --    ATORVASTATIN (LIPITOR) 20 MG TABLET    Take 1 tablet (20 mg total) by mouth once daily.       Start Date: 1/16/2024 End Date: 1/15/2025    CETIRIZINE (ZYRTEC) 10 MG TABLET    Take 1 tablet (10 mg total) by mouth once daily.       Start Date: 1/16/2024 End Date: 1/15/2025    CYCLOBENZAPRINE (FLEXERIL) 10 MG TABLET    Take 1 tablet (10 mg total) by mouth 3 (three) times daily as needed for Muscle spasms.       Start Date: 10/16/2023End Date: --     DEXTROAMPHETAMINE-AMPHETAMINE 30 MG TAB    Take 1 tablet (30 mg total) by mouth every morning.       Start Date: 1/16/2024 End Date: 2/15/2024    FUROSEMIDE (LASIX) 40 MG TABLET    Take 1 tablet (40 mg total) by mouth once daily. for 7 days       Start Date: 12/9/2023 End Date: 12/16/2023    LORATADINE (CLARITIN) 10 MG TABLET    Take 1 tablet (10 mg total) by mouth nightly as needed for Allergies.       Start Date: 10/11/2022End Date: --    MELOXICAM (MOBIC) 15 MG TABLET    Take 1 tablet (15 mg total) by mouth once daily.       Start Date: 10/16/2023End Date: --    PANTOPRAZOLE (PROTONIX) 40 MG TABLET    Take 1 tablet (40 mg total) by mouth once daily.       Start Date: 12/11/2023End Date: 3/10/2024          The patient's Health Maintenance was reviewed and the following appears to be due at this time:   Health Maintenance Due   Topic Date Due    COVID-19 Vaccine (1) Never done    Influenza Vaccine (1) 09/01/2023    Mammogram  02/06/2024         Follow up in about 3 months (around 6/12/2024) for Virtual Visit, ADD Follow Up. 1 AW.   In addition to their scheduled follow up, the patient has also been instructed to follow up on as needed basis.

## 2024-03-21 ENCOUNTER — OFFICE VISIT (OUTPATIENT)
Dept: BEHAVIORAL HEALTH | Facility: CLINIC | Age: 44
End: 2024-03-21
Payer: COMMERCIAL

## 2024-03-21 VITALS
BODY MASS INDEX: 31.4 KG/M2 | HEIGHT: 62 IN | DIASTOLIC BLOOD PRESSURE: 84 MMHG | SYSTOLIC BLOOD PRESSURE: 128 MMHG | TEMPERATURE: 98 F | WEIGHT: 170.63 LBS | HEART RATE: 76 BPM

## 2024-03-21 DIAGNOSIS — F90.0 ATTENTION DEFICIT HYPERACTIVITY DISORDER (ADHD), PREDOMINANTLY INATTENTIVE TYPE: Chronic | ICD-10-CM

## 2024-03-21 DIAGNOSIS — F51.4 NIGHT TERRORS, ADULT: Chronic | ICD-10-CM

## 2024-03-21 DIAGNOSIS — F42.2 MIXED OBSESSIONAL THOUGHTS AND ACTS: Chronic | ICD-10-CM

## 2024-03-21 DIAGNOSIS — F33.1 MODERATE EPISODE OF RECURRENT MAJOR DEPRESSIVE DISORDER: Primary | Chronic | ICD-10-CM

## 2024-03-21 DIAGNOSIS — F41.1 GENERALIZED ANXIETY DISORDER: Chronic | ICD-10-CM

## 2024-03-21 PROCEDURE — 1160F RVW MEDS BY RX/DR IN RCRD: CPT | Mod: CPTII,,, | Performed by: NURSE PRACTITIONER

## 2024-03-21 PROCEDURE — 1159F MED LIST DOCD IN RCRD: CPT | Mod: CPTII,,, | Performed by: NURSE PRACTITIONER

## 2024-03-21 PROCEDURE — 3008F BODY MASS INDEX DOCD: CPT | Mod: CPTII,,, | Performed by: NURSE PRACTITIONER

## 2024-03-21 PROCEDURE — 99215 OFFICE O/P EST HI 40 MIN: CPT | Mod: S$PBB,,, | Performed by: NURSE PRACTITIONER

## 2024-03-21 PROCEDURE — 99214 OFFICE O/P EST MOD 30 MIN: CPT | Mod: PBBFAC,PN | Performed by: NURSE PRACTITIONER

## 2024-03-21 PROCEDURE — 3074F SYST BP LT 130 MM HG: CPT | Mod: CPTII,,, | Performed by: NURSE PRACTITIONER

## 2024-03-21 PROCEDURE — 3044F HG A1C LEVEL LT 7.0%: CPT | Mod: CPTII,,, | Performed by: NURSE PRACTITIONER

## 2024-03-21 PROCEDURE — 3079F DIAST BP 80-89 MM HG: CPT | Mod: CPTII,,, | Performed by: NURSE PRACTITIONER

## 2024-03-21 RX ORDER — FLUVOXAMINE MALEATE 50 MG/1
50 TABLET, FILM COATED ORAL NIGHTLY
Qty: 60 TABLET | Refills: 3 | Status: SHIPPED | OUTPATIENT
Start: 2024-03-21

## 2024-03-21 RX ORDER — PRAZOSIN HYDROCHLORIDE 1 MG/1
1 CAPSULE ORAL NIGHTLY
Qty: 30 CAPSULE | Refills: 3 | Status: SHIPPED | OUTPATIENT
Start: 2024-03-21 | End: 2024-04-16
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
## 2024-04-16 ENCOUNTER — OFFICE VISIT (OUTPATIENT)
Dept: FAMILY MEDICINE | Facility: CLINIC | Age: 44
End: 2024-04-16
Payer: COMMERCIAL

## 2024-04-16 DIAGNOSIS — E66.09 CLASS 1 OBESITY DUE TO EXCESS CALORIES WITH SERIOUS COMORBIDITY AND BODY MASS INDEX (BMI) OF 31.0 TO 31.9 IN ADULT: ICD-10-CM

## 2024-04-16 DIAGNOSIS — F41.1 GAD (GENERALIZED ANXIETY DISORDER): ICD-10-CM

## 2024-04-16 DIAGNOSIS — F90.2 ATTENTION DEFICIT HYPERACTIVITY DISORDER (ADHD), COMBINED TYPE: ICD-10-CM

## 2024-04-16 PROCEDURE — 99213 OFFICE O/P EST LOW 20 MIN: CPT | Mod: 95,,, | Performed by: STUDENT IN AN ORGANIZED HEALTH CARE EDUCATION/TRAINING PROGRAM

## 2024-04-16 PROCEDURE — 1160F RVW MEDS BY RX/DR IN RCRD: CPT | Mod: CPTII,95,, | Performed by: STUDENT IN AN ORGANIZED HEALTH CARE EDUCATION/TRAINING PROGRAM

## 2024-04-16 PROCEDURE — 3044F HG A1C LEVEL LT 7.0%: CPT | Mod: CPTII,95,, | Performed by: STUDENT IN AN ORGANIZED HEALTH CARE EDUCATION/TRAINING PROGRAM

## 2024-04-16 PROCEDURE — 1159F MED LIST DOCD IN RCRD: CPT | Mod: CPTII,95,, | Performed by: STUDENT IN AN ORGANIZED HEALTH CARE EDUCATION/TRAINING PROGRAM

## 2024-04-16 RX ORDER — DEXTROAMPHETAMINE SACCHARATE, AMPHETAMINE ASPARTATE, DEXTROAMPHETAMINE SULFATE AND AMPHETAMINE SULFATE 7.5; 7.5; 7.5; 7.5 MG/1; MG/1; MG/1; MG/1
30 TABLET ORAL EVERY MORNING
Qty: 30 TABLET | Refills: 0 | Status: SHIPPED | OUTPATIENT
Start: 2024-06-14 | End: 2024-06-12 | Stop reason: SDUPTHER

## 2024-04-16 RX ORDER — DEXTROAMPHETAMINE SACCHARATE, AMPHETAMINE ASPARTATE, DEXTROAMPHETAMINE SULFATE AND AMPHETAMINE SULFATE 7.5; 7.5; 7.5; 7.5 MG/1; MG/1; MG/1; MG/1
30 TABLET ORAL EVERY MORNING
Qty: 30 TABLET | Refills: 0 | Status: SHIPPED | OUTPATIENT
Start: 2024-04-16 | End: 2024-06-12 | Stop reason: SDUPTHER

## 2024-04-16 RX ORDER — DEXTROAMPHETAMINE SACCHARATE, AMPHETAMINE ASPARTATE, DEXTROAMPHETAMINE SULFATE AND AMPHETAMINE SULFATE 7.5; 7.5; 7.5; 7.5 MG/1; MG/1; MG/1; MG/1
30 TABLET ORAL EVERY MORNING
Qty: 30 TABLET | Refills: 0 | Status: SHIPPED | OUTPATIENT
Start: 2024-05-15 | End: 2024-06-12 | Stop reason: SDUPTHER

## 2024-04-16 NOTE — PROGRESS NOTES
[facility] Office Visit Note - ADD Follow-up    [unfilled]    Chief Complaint   Follow-up (ADD)       History of Present Illness   This is a telemedicine note. Patient was treated using telemedicine, real time audio and video, according to [facility] protocols. Amparo STAUFFER MD, conducted the visit from the [facility]. The patient participated in the visit at a non-[facility] location selected by the patient, identified below. I am licensed in the state where the patient stated they are located. The patient stated that they understood and accepted the privacy and security risks to their information at their location. This visit is not recorded.    Patient was located at the patient's home    Disclaimer:  This note is prepared using voice recognition software and as such is likely to have errors despite attempts at proofreading. Please contact me for questions.       The patient presents today for follow-up and reevaluation of current ADHD diagnosis. The patient reports good efficacy regarding productivity and concentration. The patient reports 100% compliance with the medication regimen. The patient reports no present or intolerable side effects to the medication regimen. The patient reports no change in primary pharmacy since the last documented visit with our office. no    Review of Systems     GENERAL:   no significant weight changes, no tics, no insomnia, Obese  CARDIAC:  no chest pain, no palpations or SOB  ABD: no n/v, no diarrhea  Psych:  no increased anxiety, medication working adequately    Physical Exam   There were no vitals taken for this visit.  GENERAL:  NAD, no significant weight loss  CHEST:  CTA bilaterally  CARDIAC:  RRR no murmurs audible  PSYCH: Appropriate mood, affect normal      Assessment and Plan   1. Attention deficit hyperactivity disorder (ADHD), combined type  -     dextroamphetamine-amphetamine 30 mg Tab; Take 1 tablet (30 mg total) by mouth every morning.  Dispense: 30 tablet;  Refill: 0  -     dextroamphetamine-amphetamine 30 mg Tab; Take 1 tablet (30 mg total) by mouth every morning.  Dispense: 30 tablet; Refill: 0  -     dextroamphetamine-amphetamine 30 mg Tab; Take 1 tablet (30 mg total) by mouth every morning.  Dispense: 30 tablet; Refill: 0  -Regimen is efficacious/no side effects/no change to medication regimen.  -Continue medication regimen no change  -1 prescription e-prescribed the patient with appropriate fill dates  - checked in past and as indicated  -Scheduled drug contract signed/reviewed  -Medication side effect profile discussed at length    Follow up in about 3 months (around 7/16/2024) for Virtual Visit ADHD.     Education and counseling done face to face regarding medical conditions and plan. Contact office if new symptoms develop. Should any symptoms ever significantly worsen seek emergency medical attention/go to ER. Follow up at least yearly for wellness or sooner PRN. Nurse to call patient with any results. The patient is receptive, expresses understanding and is agreeable to plan. All questions have been answered.    2. ELZA (generalized anxiety disorder)  Continue fluvoxamine as prescribed   Continue mental health counseling    3. Obesity with BMI 31.20   There is no height or weight on file to calculate BMI.  Goal BMI <30.  Exercise 5 times a week for 30 minutes per day.  Avoid soda, simple sugars, excessive rice, potatoes or bread. Limit fast foods and fried foods.  Choose complex carbs in moderation (example: green vegetables, beans, oatmeal). Eat plenty of fresh fruits and vegetables with lean meats daily.  Do not skip meals. Eat a balanced portion size.  Avoid fad diets. Consider permanent healthy life style changes.       Amparo Thomson M.D.    Video Time Documentation:  Spent 10 minutes with patient face to face discussed health concerns. More than 50% of this time was spent in counseling and coordination of care.    Follow up in 3 months virtually  for ADD  Answers submitted by the patient for this visit:  Review of Systems Questionnaire (Submitted on 4/16/2024)  activity change: No  unexpected weight change: No  neck pain: No  hearing loss: No  rhinorrhea: No  trouble swallowing: No  eye discharge: No  visual disturbance: No  chest tightness: No  wheezing: No  chest pain: No  palpitations: No  blood in stool: No  constipation: No  vomiting: No  diarrhea: No  polydipsia: No  polyuria: No  difficulty urinating: No  hematuria: No  menstrual problem: No  dysuria: No  joint swelling: No  arthralgias: No  headaches: No  weakness: No  confusion: No  dysphoric mood: No

## 2024-05-01 ENCOUNTER — TELEPHONE (OUTPATIENT)
Dept: FAMILY MEDICINE | Facility: CLINIC | Age: 44
End: 2024-05-01

## 2024-05-01 NOTE — TELEPHONE ENCOUNTER
----- Message from Amparo Thomson MD sent at 4/30/2024  5:22 PM CDT -----  Normal MMG. Repeat in 1 year.

## 2024-06-12 ENCOUNTER — OFFICE VISIT (OUTPATIENT)
Dept: FAMILY MEDICINE | Facility: CLINIC | Age: 44
End: 2024-06-12
Payer: COMMERCIAL

## 2024-06-12 VITALS — BODY MASS INDEX: 29.27 KG/M2 | HEIGHT: 61 IN | WEIGHT: 155 LBS

## 2024-06-12 DIAGNOSIS — F90.2 ATTENTION DEFICIT HYPERACTIVITY DISORDER (ADHD), COMBINED TYPE: Primary | ICD-10-CM

## 2024-06-12 PROCEDURE — 1160F RVW MEDS BY RX/DR IN RCRD: CPT | Mod: CPTII,95,, | Performed by: STUDENT IN AN ORGANIZED HEALTH CARE EDUCATION/TRAINING PROGRAM

## 2024-06-12 PROCEDURE — 1159F MED LIST DOCD IN RCRD: CPT | Mod: CPTII,95,, | Performed by: STUDENT IN AN ORGANIZED HEALTH CARE EDUCATION/TRAINING PROGRAM

## 2024-06-12 PROCEDURE — 3008F BODY MASS INDEX DOCD: CPT | Mod: CPTII,95,, | Performed by: STUDENT IN AN ORGANIZED HEALTH CARE EDUCATION/TRAINING PROGRAM

## 2024-06-12 PROCEDURE — 3044F HG A1C LEVEL LT 7.0%: CPT | Mod: CPTII,95,, | Performed by: STUDENT IN AN ORGANIZED HEALTH CARE EDUCATION/TRAINING PROGRAM

## 2024-06-12 PROCEDURE — 99213 OFFICE O/P EST LOW 20 MIN: CPT | Mod: 95,,, | Performed by: STUDENT IN AN ORGANIZED HEALTH CARE EDUCATION/TRAINING PROGRAM

## 2024-06-12 RX ORDER — DEXTROAMPHETAMINE SACCHARATE, AMPHETAMINE ASPARTATE, DEXTROAMPHETAMINE SULFATE AND AMPHETAMINE SULFATE 7.5; 7.5; 7.5; 7.5 MG/1; MG/1; MG/1; MG/1
30 TABLET ORAL EVERY MORNING
Qty: 30 TABLET | Refills: 0 | Status: SHIPPED | OUTPATIENT
Start: 2024-08-10 | End: 2024-06-12

## 2024-06-12 RX ORDER — DEXTROAMPHETAMINE SACCHARATE, AMPHETAMINE ASPARTATE, DEXTROAMPHETAMINE SULFATE AND AMPHETAMINE SULFATE 7.5; 7.5; 7.5; 7.5 MG/1; MG/1; MG/1; MG/1
30 TABLET ORAL EVERY MORNING
Qty: 30 TABLET | Refills: 0 | Status: SHIPPED | OUTPATIENT
Start: 2024-07-11 | End: 2024-08-10

## 2024-06-12 RX ORDER — DEXTROAMPHETAMINE SACCHARATE, AMPHETAMINE ASPARTATE, DEXTROAMPHETAMINE SULFATE AND AMPHETAMINE SULFATE 7.5; 7.5; 7.5; 7.5 MG/1; MG/1; MG/1; MG/1
30 TABLET ORAL EVERY MORNING
Qty: 30 TABLET | Refills: 0 | Status: SHIPPED | OUTPATIENT
Start: 2024-06-12 | End: 2024-06-12

## 2024-06-12 RX ORDER — DEXTROAMPHETAMINE SACCHARATE, AMPHETAMINE ASPARTATE, DEXTROAMPHETAMINE SULFATE AND AMPHETAMINE SULFATE 7.5; 7.5; 7.5; 7.5 MG/1; MG/1; MG/1; MG/1
30 TABLET ORAL EVERY MORNING
Qty: 30 TABLET | Refills: 0 | Status: SHIPPED | OUTPATIENT
Start: 2024-06-12 | End: 2024-07-12

## 2024-06-12 RX ORDER — DEXTROAMPHETAMINE SACCHARATE, AMPHETAMINE ASPARTATE, DEXTROAMPHETAMINE SULFATE AND AMPHETAMINE SULFATE 7.5; 7.5; 7.5; 7.5 MG/1; MG/1; MG/1; MG/1
30 TABLET ORAL EVERY MORNING
Qty: 30 TABLET | Refills: 0 | Status: SHIPPED | OUTPATIENT
Start: 2024-08-10 | End: 2024-09-09

## 2024-06-12 RX ORDER — PRAZOSIN HYDROCHLORIDE 1 MG/1
1 CAPSULE ORAL 2 TIMES DAILY
COMMUNITY

## 2024-06-12 RX ORDER — DEXTROAMPHETAMINE SACCHARATE, AMPHETAMINE ASPARTATE, DEXTROAMPHETAMINE SULFATE AND AMPHETAMINE SULFATE 7.5; 7.5; 7.5; 7.5 MG/1; MG/1; MG/1; MG/1
30 TABLET ORAL EVERY MORNING
Qty: 30 TABLET | Refills: 0 | Status: SHIPPED | OUTPATIENT
Start: 2024-07-11 | End: 2024-06-12

## 2024-06-12 NOTE — PROGRESS NOTES
"  [facility] Office Visit Note - ADD Follow-up    [unfilled]    Chief Complaint   Follow-up (ADD)       History of Present Illness   This is a telemedicine note. Patient was treated using telemedicine, real time audio and video, according to [facility] protocols. Amparo STAUFFER MD, conducted the visit from the [facility]. The patient participated in the visit at a non-[facility] location selected by the patient, identified below. I am licensed in the state where the patient stated they are located. The patient stated that they understood and accepted the privacy and security risks to their information at their location. This visit is not recorded.    Patient was located at the patient's home    Disclaimer:  This note is prepared using voice recognition software and as such is likely to have errors despite attempts at proofreading. Please contact me for questions.       The patient presents today for follow-up and reevaluation of current ADHD diagnosis. The patient reports good efficacy regarding productivity and concentration. The patient reports 100% compliance with the medication regimen. The patient reports no present or intolerable side effects to the medication regimen. The patient reports no change in primary pharmacy since the last documented visit with our office.     Review of Systems     GENERAL:   no significant weight changes, no tics, no insomnia  CARDIAC:  no chest pain, no palpations or SOB  ABD: no n/v, no diarrhea  Psych:  no increased anxiety, medication working adequately    Physical Exam   Visit Vitals  Ht 5' 1" (1.549 m)   Wt 70.3 kg (155 lb)   BMI 29.29 kg/m²     GENERAL:  NAD, no significant weight loss  CHEST:  CTA bilaterally  CARDIAC:  RRR no murmurs audible  PSYCH: Appropriate mood, affect normal      Assessment and Plan   1. Attention deficit hyperactivity disorder (ADHD), combined type  -     Discontinue: dextroamphetamine-amphetamine 30 mg Tab; Take 1 tablet (30 mg total) by mouth " every morning.  Dispense: 30 tablet; Refill: 0  -     Discontinue: dextroamphetamine-amphetamine 30 mg Tab; Take 1 tablet (30 mg total) by mouth every morning.  Dispense: 30 tablet; Refill: 0  -     Discontinue: dextroamphetamine-amphetamine 30 mg Tab; Take 1 tablet (30 mg total) by mouth every morning.  Dispense: 30 tablet; Refill: 0  -     dextroamphetamine-amphetamine 30 mg Tab; Take 1 tablet (30 mg total) by mouth every morning.  Dispense: 30 tablet; Refill: 0  -     dextroamphetamine-amphetamine 30 mg Tab; Take 1 tablet (30 mg total) by mouth every morning.  Dispense: 30 tablet; Refill: 0  -     dextroamphetamine-amphetamine 30 mg Tab; Take 1 tablet (30 mg total) by mouth every morning.  Dispense: 30 tablet; Refill: 0       -Regimen is efficacious/no side effects/no change to medication regimen.  -Continue medication regimen no change  -Rx refilled with appropriate fill dates  - reviewed  -Scheduled drug contract signed/reviewed  -Medication side effect profile discussed at length  -if experiencing any shortness a breath, chest pain, palpitations, dizziness, loss of consciousness does not discontinue the medication and report to ER  -Medication is teratogenic.  Recommend the patient to not get pregnant while on medication.  If gets pregnant quit taking medication in report to MD  Follow up in 3 months or sooner if needed    The patient is receptive, expresses understanding and is agreeable to plan. All questions have been answered.        Follow up in about 3 months (around 9/12/2024) for Virtual Visit, ADD Follow Up.     Education and counseling done face to face regarding medical conditions and plan. Contact office if new symptoms develop. Should any symptoms ever significantly worsen seek emergency medical attention/go to ER. Follow up at least yearly for wellness or sooner PRN. Nurse to call patient with any results. The patient is receptive, expresses understanding and is agreeable to plan. All  questions have been answered.        Amparo Thomson M.D.    Video Time Documentation:  Spent 10 minutes with patient face to face discussed health concerns. More than 50% of this time was spent in counseling and coordination of care.      Answers submitted by the patient for this visit:  Review of Systems Questionnaire (Submitted on 6/12/2024)  activity change: No  unexpected weight change: No  neck pain: Yes  hearing loss: No  rhinorrhea: No  trouble swallowing: No  eye discharge: No  visual disturbance: No  chest tightness: No  wheezing: No  chest pain: No  palpitations: No  blood in stool: No  constipation: No  vomiting: No  diarrhea: No  polydipsia: No  polyuria: No  difficulty urinating: No  hematuria: No  menstrual problem: No  dysuria: No  joint swelling: No  arthralgias: No  headaches: No  weakness: No  confusion: No  dysphoric mood: No

## 2024-06-14 DIAGNOSIS — K21.9 GASTROESOPHAGEAL REFLUX DISEASE WITHOUT ESOPHAGITIS: Chronic | ICD-10-CM

## 2024-06-14 RX ORDER — PANTOPRAZOLE SODIUM 40 MG/1
40 TABLET, DELAYED RELEASE ORAL DAILY
Qty: 90 TABLET | Refills: 0 | Status: SHIPPED | OUTPATIENT
Start: 2024-06-14 | End: 2024-09-12

## 2024-06-20 ENCOUNTER — OFFICE VISIT (OUTPATIENT)
Dept: BEHAVIORAL HEALTH | Facility: CLINIC | Age: 44
End: 2024-06-20

## 2024-06-20 DIAGNOSIS — F51.4 NIGHT TERRORS, ADULT: Chronic | ICD-10-CM

## 2024-06-20 DIAGNOSIS — F90.0 ATTENTION DEFICIT HYPERACTIVITY DISORDER (ADHD), PREDOMINANTLY INATTENTIVE TYPE: Chronic | ICD-10-CM

## 2024-06-20 DIAGNOSIS — F42.2 MIXED OBSESSIONAL THOUGHTS AND ACTS: Chronic | ICD-10-CM

## 2024-06-20 DIAGNOSIS — F33.1 MODERATE EPISODE OF RECURRENT MAJOR DEPRESSIVE DISORDER: Primary | Chronic | ICD-10-CM

## 2024-06-20 DIAGNOSIS — F41.1 GENERALIZED ANXIETY DISORDER: Chronic | ICD-10-CM

## 2024-06-20 RX ORDER — FLUVOXAMINE MALEATE 100 MG/1
100 TABLET, COATED ORAL NIGHTLY
Qty: 30 TABLET | Refills: 3 | Status: SHIPPED | OUTPATIENT
Start: 2024-06-20
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
## 2024-07-16 ENCOUNTER — PATIENT MESSAGE (OUTPATIENT)
Dept: BEHAVIORAL HEALTH | Facility: CLINIC | Age: 44
End: 2024-07-16

## 2024-07-30 ENCOUNTER — PATIENT MESSAGE (OUTPATIENT)
Dept: FAMILY MEDICINE | Facility: CLINIC | Age: 44
End: 2024-07-30

## 2024-07-31 RX ORDER — ALPRAZOLAM 0.5 MG/1
0.5 TABLET ORAL 2 TIMES DAILY
Qty: 30 TABLET | Refills: 0 | OUTPATIENT
Start: 2024-07-31

## 2024-08-19 ENCOUNTER — OFFICE VISIT (OUTPATIENT)
Dept: BEHAVIORAL HEALTH | Facility: CLINIC | Age: 44
End: 2024-08-19

## 2024-08-19 DIAGNOSIS — F51.4 NIGHT TERRORS, ADULT: Chronic | ICD-10-CM

## 2024-08-19 DIAGNOSIS — F33.1 MODERATE EPISODE OF RECURRENT MAJOR DEPRESSIVE DISORDER: Primary | Chronic | ICD-10-CM

## 2024-08-19 DIAGNOSIS — F41.1 GENERALIZED ANXIETY DISORDER: Chronic | ICD-10-CM

## 2024-08-19 DIAGNOSIS — F90.0 ATTENTION DEFICIT HYPERACTIVITY DISORDER (ADHD), PREDOMINANTLY INATTENTIVE TYPE: Chronic | ICD-10-CM

## 2024-08-19 DIAGNOSIS — F42.2 MIXED OBSESSIONAL THOUGHTS AND ACTS: Chronic | ICD-10-CM

## 2024-08-19 PROCEDURE — 99499 UNLISTED E&M SERVICE: CPT | Mod: 95,,, | Performed by: NURSE PRACTITIONER
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
## 2024-09-18 ENCOUNTER — OFFICE VISIT (OUTPATIENT)
Dept: FAMILY MEDICINE | Facility: CLINIC | Age: 44
End: 2024-09-18

## 2024-09-18 VITALS — WEIGHT: 183 LBS | HEIGHT: 61 IN | BODY MASS INDEX: 34.55 KG/M2

## 2024-09-18 DIAGNOSIS — F33.1 MODERATE EPISODE OF RECURRENT MAJOR DEPRESSIVE DISORDER: Chronic | ICD-10-CM

## 2024-09-18 DIAGNOSIS — F90.2 ATTENTION DEFICIT HYPERACTIVITY DISORDER (ADHD), COMBINED TYPE: ICD-10-CM

## 2024-09-18 DIAGNOSIS — F43.0 ACUTE STRESS DISORDER: ICD-10-CM

## 2024-09-18 DIAGNOSIS — F41.1 GENERALIZED ANXIETY DISORDER: Chronic | ICD-10-CM

## 2024-09-18 DIAGNOSIS — F42.2 MIXED OBSESSIONAL THOUGHTS AND ACTS: Chronic | ICD-10-CM

## 2024-09-18 PROCEDURE — 99214 OFFICE O/P EST MOD 30 MIN: CPT | Mod: 95,,, | Performed by: STUDENT IN AN ORGANIZED HEALTH CARE EDUCATION/TRAINING PROGRAM

## 2024-09-18 RX ORDER — DEXTROAMPHETAMINE SACCHARATE, AMPHETAMINE ASPARTATE, DEXTROAMPHETAMINE SULFATE AND AMPHETAMINE SULFATE 7.5; 7.5; 7.5; 7.5 MG/1; MG/1; MG/1; MG/1
30 TABLET ORAL EVERY MORNING
Qty: 30 TABLET | Refills: 0 | Status: SHIPPED | OUTPATIENT
Start: 2024-09-18 | End: 2024-10-18

## 2024-09-18 RX ORDER — CLONAZEPAM 0.5 MG/1
0.5 TABLET, ORALLY DISINTEGRATING ORAL DAILY PRN
Qty: 30 TABLET | Refills: 0 | Status: SHIPPED | OUTPATIENT
Start: 2024-09-18 | End: 2024-10-18

## 2024-09-18 RX ORDER — ALPRAZOLAM 0.5 MG/1
TABLET ORAL
COMMUNITY
End: 2024-09-18

## 2024-09-18 NOTE — PROGRESS NOTES
"  [facility] Office Visit Note - ADD Follow-up    [unfilled]    Chief Complaint   Follow-up       History of Present Illness   This is a telemedicine note. Patient was treated using telemedicine, real time audio and video, according to [facility] protocols. Amparo STAUFFER MD, conducted the visit from the [facility]. The patient participated in the visit at a non-[facility] location selected by the patient, identified below. I am licensed in the state where the patient stated they are located. The patient stated that they understood and accepted the privacy and security risks to their information at their location. This visit is not recorded.    Patient was located at the patient's home    Disclaimer:  This note is prepared using voice recognition software and as such is likely to have errors despite attempts at proofreading. Please contact me for questions.     ADHD  The patient presents today for follow-up and reevaluation of current ADHD diagnosis. The patient reports good efficacy regarding productivity and concentration. The patient reports 100% compliance with the medication regimen. The patient reports no present or intolerable side effects to the medication regimen. The patient reports no change in primary pharmacy since the last documented visit with our office.     Stress  Patient reports that she has been stressed out lately and has been having more panic attacks.  Has a difficult family dynamic.  Recently started job.  Gained 15 lb because of depression and anxiety.        Review of Systems     GENERAL:   no significant weight changes, no tics, no insomnia  CARDIAC:  no chest pain, no palpations or SOB  ABD: no n/v, no diarrhea  Psych:  no increased anxiety, medication working adequately    Physical Exam   Visit Vitals  Ht 5' 1" (1.549 m)   Wt 83 kg (183 lb)   BMI 34.58 kg/m²     GENERAL:  NAD, no significant weight loss  CHEST:  CTA bilaterally  CARDIAC:  RRR no murmurs audible  PSYCH: Appropriate " mood, affect normal      Assessment and Plan   1. Acute stress disorder  2. Moderate episode of recurrent major depressive disorder  3. Generalized anxiety disorder  4. Mixed obsessional thoughts and acts  Start Klonopin as prescribed.  Discontinue Xanax   reviewed   Drug side effects discussed in detail with the patient  Continue fluvoxamine    5. Attention deficit hyperactivity disorder (ADHD), combined type  -Continue medication regimen as prescribed  -Rx refilled with appropriate fill dates  - reviewed  -Scheduled drug contract reviewed  -Medication side effect profile discussed at length  -if experiencing any shortness a breath, chest pain, palpitations, dizziness, loss of consciousness does not discontinue the medication and report to ER    -Medication is teratogenic.  Recommend the patient to not get pregnant while on medication.  If gets pregnant quit taking medication in report to MD      Follow up in about 3 months (around 12/18/2024) for Virtual Visit, ADD Follow Up.     Education and counseling done face to face regarding medical conditions and plan. Contact office if new symptoms develop. Should any symptoms ever significantly worsen seek emergency medical attention/go to ER. Follow up at least yearly for wellness or sooner PRN. Nurse to call patient with any results. The patient is receptive, expresses understanding and is agreeable to plan. All questions have been answered.        Amparo Thomson M.D.    Future Appointments   Date Time Provider Department Center   3/17/2025  2:30 PM Amparo Thomson MD [facility]          Answers submitted by the patient for this visit:  Review of Systems Questionnaire (Submitted on 9/18/2024)  activity change: Yes  unexpected weight change: Yes  neck pain: No  hearing loss: No  rhinorrhea: No  trouble swallowing: No  eye discharge: No  visual disturbance: No  chest tightness: No  wheezing: No  chest pain: No  palpitations: No  blood in stool:  No  constipation: No  vomiting: No  diarrhea: No  polydipsia: No  polyuria: No  difficulty urinating: No  hematuria: No  menstrual problem: No  dysuria: No  joint swelling: No  arthralgias: No  headaches: No  weakness: No  confusion: No  dysphoric mood: Yes

## 2024-10-15 ENCOUNTER — OFFICE VISIT (OUTPATIENT)
Dept: FAMILY MEDICINE | Facility: CLINIC | Age: 44
End: 2024-10-15

## 2024-10-15 DIAGNOSIS — J06.9 VIRAL UPPER RESPIRATORY TRACT INFECTION: ICD-10-CM

## 2024-10-15 DIAGNOSIS — R05.9 COUGH, UNSPECIFIED TYPE: ICD-10-CM

## 2024-10-15 DIAGNOSIS — E78.2 MIXED HYPERLIPIDEMIA: ICD-10-CM

## 2024-10-15 DIAGNOSIS — F41.1 GENERALIZED ANXIETY DISORDER: Chronic | ICD-10-CM

## 2024-10-15 DIAGNOSIS — F43.0 ACUTE STRESS DISORDER: ICD-10-CM

## 2024-10-15 DIAGNOSIS — I73.00 RAYNAUD'S DISEASE WITHOUT GANGRENE: Chronic | ICD-10-CM

## 2024-10-15 DIAGNOSIS — K21.9 GASTROESOPHAGEAL REFLUX DISEASE WITHOUT ESOPHAGITIS: ICD-10-CM

## 2024-10-15 DIAGNOSIS — R06.2 WHEEZE: ICD-10-CM

## 2024-10-15 PROCEDURE — 99214 OFFICE O/P EST MOD 30 MIN: CPT | Mod: 95,,, | Performed by: STUDENT IN AN ORGANIZED HEALTH CARE EDUCATION/TRAINING PROGRAM

## 2024-10-15 RX ORDER — PROMETHAZINE HYDROCHLORIDE AND DEXTROMETHORPHAN HYDROBROMIDE 6.25; 15 MG/5ML; MG/5ML
5 SYRUP ORAL EVERY 8 HOURS PRN
Qty: 118 ML | Refills: 0 | Status: SHIPPED | OUTPATIENT
Start: 2024-10-15 | End: 2024-10-25

## 2024-10-15 RX ORDER — ATORVASTATIN CALCIUM 20 MG/1
20 TABLET, FILM COATED ORAL DAILY
Qty: 90 TABLET | Refills: 3 | Status: SHIPPED | OUTPATIENT
Start: 2024-10-15 | End: 2025-10-15

## 2024-10-15 RX ORDER — PREDNISONE 20 MG/1
20 TABLET ORAL 2 TIMES DAILY
Qty: 10 TABLET | Refills: 0 | Status: SHIPPED | OUTPATIENT
Start: 2024-10-15 | End: 2024-10-20

## 2024-10-15 RX ORDER — AMLODIPINE BESYLATE 5 MG/1
5 TABLET ORAL DAILY
Qty: 90 TABLET | Refills: 3 | Status: SHIPPED | OUTPATIENT
Start: 2024-10-15

## 2024-10-15 RX ORDER — BENZONATATE 200 MG/1
200 CAPSULE ORAL 3 TIMES DAILY PRN
Qty: 30 CAPSULE | Refills: 0 | Status: SHIPPED | OUTPATIENT
Start: 2024-10-15 | End: 2024-10-25

## 2024-10-15 RX ORDER — ALBUTEROL SULFATE 90 UG/1
2 INHALANT RESPIRATORY (INHALATION) EVERY 6 HOURS PRN
Qty: 18 G | Refills: 0 | Status: SHIPPED | OUTPATIENT
Start: 2024-10-15 | End: 2025-10-15

## 2024-10-15 RX ORDER — CLONAZEPAM 0.5 MG/1
0.5 TABLET, ORALLY DISINTEGRATING ORAL DAILY PRN
Qty: 30 TABLET | Refills: 0 | Status: SHIPPED | OUTPATIENT
Start: 2024-10-18 | End: 2024-11-17

## 2024-10-15 RX ORDER — PANTOPRAZOLE SODIUM 40 MG/1
40 TABLET, DELAYED RELEASE ORAL DAILY
Qty: 90 TABLET | Refills: 0 | Status: SHIPPED | OUTPATIENT
Start: 2024-10-15 | End: 2025-01-13

## 2024-10-15 NOTE — PROGRESS NOTES
Patient ID: [id number]     Chief Complaint: No chief complaint on file.      HPI:     This is a telemedicine note. Patient was treated using telemedicine, real time audio and video, according to [facility] protocols. I, Amparo Thomson MD, conducted the visit from the [facility]. The patient participated in the visit at a non-[facility] location selected by the patient, identified below. I am licensed in the state where the patient stated they are located. The patient stated that they understood and accepted the privacy and security risks to their information at their location. This visit is not recorded.    Patient was located at the patient's home.       Fadia Thorpe is a 43 y.o. female here today for a telemedicine visit.     Cough/wheeze/fever  Recently returned from [place] trip a day ago and has been having mild grade fever, dry cough and wheeze.  Works as a nurse.  Denies of any shortness a breath, palpitations, rhinorrhea    Would like to get refills for Klonopin generalized anxiety/panic disorder  Would like to get refills for her amlodipine, Protonix    -------------------------------------    Attention deficit hyperactivity disorder (ADHD), predominantly inattentive type    Gastroesophageal reflux disease    Generalized anxiety disorder    Major depressive disorder    Obesity    Raynaud's disease    Seasonal allergic rhinitis        Past Surgical History:   Procedure Laterality Date    abdominal plasty      BREAST BIOPSY  06/04/2019    BREAST SURGERY  12/4/2023    Breast reduction    COSMETIC SURGERY  12/4/2023    Liposuction 360 and abdominoplasty    HYSTERECTOMY  06/13/2019    lyposuction      REDUCTION OF BOTH BREASTS Bilateral        Review of patient's allergies indicates:  No Known Allergies    Current Outpatient Medications   Medication Instructions    albuterol (VENTOLIN HFA) 90 mcg/actuation inhaler 2 puffs, Inhalation, Every 6 hours PRN, Rescue    amLODIPine (NORVASC) 5 mg, Oral, Daily     atorvastatin (LIPITOR) 20 mg, Oral, Daily    benzonatate (TESSALON) 200 mg, Oral, 3 times daily PRN    [START ON 10/18/2024] clonazePAM (KLONOPIN) 0.5 mg, Oral, Daily PRN    dextroamphetamine-amphetamine 30 mg Tab 30 mg, Oral, Every morning    fluvoxaMINE (LUVOX) 100 mg, Oral, Nightly    loratadine (CLARITIN) 10 mg, Oral, Nightly PRN    pantoprazole (PROTONIX) 40 mg, Oral, Daily    prazosin (MINIPRESS) 1 mg, Oral, 2 times daily    predniSONE (DELTASONE) 20 mg, Oral, 2 times daily    promethazine-dextromethorphan (PROMETHAZINE-DM) 6.25-15 mg/5 mL Syrp 5 mLs, Oral, Every 8 hours PRN       Social History     Socioeconomic History    Marital status:    Tobacco Use    Smoking status: Never     Passive exposure: Never    Smokeless tobacco: Never   Substance and Sexual Activity    Alcohol use: Yes     Alcohol/week: 10.0 standard drinks of alcohol     Types: 10 Glasses of wine per week     Comment: Socially or to unwind    Drug use: Never    Sexual activity: Yes     Partners: Male     Birth control/protection: See Surgical Hx     Social Drivers of Health     Financial Resource Strain: Low Risk  (1/16/2024)    Overall Financial Resource Strain (CARDIA)     Difficulty of Paying Living Expenses: Not hard at all   Food Insecurity: No Food Insecurity (1/16/2024)    Hunger Vital Sign     Worried About Running Out of Food in the Last Year: Never true     Ran Out of Food in the Last Year: Never true   Transportation Needs: No Transportation Needs (1/16/2024)    PRAPARE - Transportation     Lack of Transportation (Medical): No     Lack of Transportation (Non-Medical): No   Physical Activity: Unknown (1/16/2024)    Exercise Vital Sign     Days of Exercise per Week: 0 days   Stress: No Stress Concern Present (1/16/2024)    [employer] - Occupational Stress Questionnaire     Feeling of Stress : Not at all   Housing Stability: Low Risk  (1/16/2024)    Housing Stability Vital Sign     Unable to Pay for  Housing in the Last Year: No     Number of Places Lived in the Last Year: 1     Unstable Housing in the Last Year: No        Family History   Problem Relation Name Age of Onset    Coronary artery disease Mother Josefa     Heart attack Mother Josefa 40    COPD Mother Josefa     Depression Mother Josefa     Diabetes Mother Josefa     Heart disease Mother Josefa     Hyperlipidemia Mother Josefa     Mental illness Mother Josefa         Bipolar    Coronary artery disease Father Reggis Sr.     COPD Father Reggis Sr.     Heart disease Father Reggis Sr.     Hyperlipidemia Father Reggis Sr.     Hypertension Father Reggis Sr.     Kidney disease Father Reggis Sr.     Alcohol abuse Father Reggis Sr.     Stroke Father Reggis Sr.     Cancer Maternal Aunt Millie     Depression Brother CJ     Mental illness Brother CJ     Drug abuse Brother CJ     Depression Sister Pilar     Depression Brother Reggis     Diabetes Brother Reggis     Early death Brother Reggis         Covid age 45    Diabetes Brother Messi     Drug abuse Sister Veronica     Alcohol abuse Sister Franchesca     Mental illness Sister Franchesca     Stroke Sister Shelia         Patient Care Team:  Amparo Thomson MD as PCP - General (Family Medicine)  Amparo Thomson MD as PCP - Family Medicine (Family Medicine)  Penny Figueroa MD (Obstetrics and Gynecology)      Subjective:       ROS    See HPI for details    Constitutional: Denies Change in appetite. Denies Chills. Denies Fever. Denies Night sweats.  Eye: Denies Blurred vision. Denies Discharge. Denies Eye pain.  ENT: Denies Decreased hearing. Denies Sore throat. Denies Swollen glands.  Respiratory: Denies Cough. Denies Shortness of breath. Denies Shortness of breath with exertion. Denies Wheezing.  Cardiovascular: DeniesChest pain at rest. Denies Chest pain with exertion. Denies Irregular heartbeat. Denies Palpitations. Denies Edema.  Gastrointestinal: Denies Abdominal pain. DeniesDiarrhea. Denies Nausea. Denies Vomiting. Denies  Hematemesis or Hematochezia.  Genitourinary: Denies Dysuria. Denies Urinary frequency. Denies Urinary urgency. Denies Blood in urine.  Endocrine: Denies Cold intolerance. Denies Excessive thirst. Denies Heat intolerance. Denies Weight loss. Denies Weight gain.  Musculoskeletal: Denies Painful joints. Denies Weakness.  Integumentary: Denies Rash. Denies Itching. Denies Dry skin.  Neurologic: Denies Dizziness. Denies Fainting. Denies Headache.  Psychiatric: Denies Depression. Denies Anxiety. Denies Suicidal/Homicidal ideations.    All Other ROS: Negative except as stated in HPI.       Objective:     There were no vitals taken for this visit.    Physical Exam    Physical Exam: LIMITED DUE TO TELEMEDICINE RESTRICTIONS.  General: Alert and oriented, No acute distress.  Head: Normocephalic, Atraumatic.  Eye: Sclera non-icteric.  Neck/Thyroid:  Full range of motion.  Respiratory: Non-labored respirations, Symmetrical chest wall expansion.  Musculoskeletal: Normal range of motion.  Integumentary:  No visible suspicious lesions or rashes. No diaphoresis.   Neurologic: No focal deficits  Psychiatric: Normal interaction, Coherent speech, Euthymic mood, Appropriate affect       Assessment:       ICD-10-CM ICD-9-CM   1. Viral upper respiratory tract infection  J06.9 465.9   2. Cough, unspecified type  R05.9 786.2   3. Wheeze  R06.2 786.07   4. Generalized anxiety disorder  F41.1 300.02   5. Acute stress disorder  F43.0 308.3   6. Gastroesophageal reflux disease without esophagitis  K21.9 530.81   7. Mixed hyperlipidemia  E78.2 272.2   8. Raynaud's disease without gangrene  I73.00 443.0        Plan:     1. Viral upper respiratory tract infection  -     COVID/RSV/FLU A&B PCR; Future; Expected date: 10/15/2024  -     X-Ray Chest PA And Lateral; Future; Expected date: 10/15/2024    2. Cough, unspecified type  -     promethazine-dextromethorphan (PROMETHAZINE-DM) 6.25-15 mg/5 mL Syrp; Take 5 mLs by mouth every 8 (eight) hours as  needed (cough).  Dispense: 118 mL; Refill: 0  -     benzonatate (TESSALON) 200 MG capsule; Take 1 capsule (200 mg total) by mouth 3 (three) times daily as needed for Cough.  Dispense: 30 capsule; Refill: 0  -     predniSONE (DELTASONE) 20 MG tablet; Take 1 tablet (20 mg total) by mouth 2 (two) times daily. for 5 days  Dispense: 10 tablet; Refill: 0    3. Wheeze  -     albuterol (VENTOLIN HFA) 90 mcg/actuation inhaler; Inhale 2 puffs into the lungs every 6 (six) hours as needed for Wheezing. Rescue  Dispense: 18 g; Refill: 0    4. Generalized anxiety disorder  5. Acute stress disorder  -Continue medication regimen as prescribed  -Rx refilled with appropriate fill dates  - reviewed  -Scheduled drug contract reviewed  -Medication side effect profile discussed at length  -if experiencing any shortness a breath, chest pain, palpitations, dizziness, loss of consciousness does not discontinue the medication and report to ER    -Medication is teratogenic.  Recommend the patient to not get pregnant while on medication.  If gets pregnant quit taking medication in report to MD      -     clonazePAM (KLONOPIN) 0.5 MG disintegrating tablet; Take 1 tablet (0.5 mg total) by mouth daily as needed (Panic attacks).  Dispense: 30 tablet; Refill: 0    6. Gastroesophageal reflux disease without esophagitis  PPI as prescribed  Avoid high greasy, minty, chocolate , spicy, acidic, fried foods and alcohol.  Reduce caffeine intake, avoid soda.Recommend to take last meal no later than 6 PM  Avoid tight clothing, chew food thoroughly.    -     pantoprazole (PROTONIX) 40 MG tablet; Take 1 tablet (40 mg total) by mouth once daily.  Dispense: 90 tablet; Refill: 0    7. Mixed hyperlipidemia  Keep blood pressure less than 130/80, A1c less than 7, goal LDL <70   Keep scheduled follow up with Cardiology   Continue 35 minutes of brisk walking and Mediterranean diet    -     atorvastatin (LIPITOR) 20 MG tablet; Take 1 tablet (20 mg total) by mouth  once daily.  Dispense: 90 tablet; Refill: 3    8. Raynaud's disease without gangrene  -     amLODIPine (NORVASC) 5 MG tablet; Take 1 tablet (5 mg total) by mouth Daily.  Dispense: 90 tablet; Refill: 3           Medication List with Changes/Refills   New Medications    ALBUTEROL (VENTOLIN HFA) 90 MCG/ACTUATION INHALER    Inhale 2 puffs into the lungs every 6 (six) hours as needed for Wheezing. Rescue       Start Date: 10/15/2024End Date: 10/15/2025    BENZONATATE (TESSALON) 200 MG CAPSULE    Take 1 capsule (200 mg total) by mouth 3 (three) times daily as needed for Cough.       Start Date: 10/15/2024End Date: 10/25/2024    PREDNISONE (DELTASONE) 20 MG TABLET    Take 1 tablet (20 mg total) by mouth 2 (two) times daily. for 5 days       Start Date: 10/15/2024End Date: 10/20/2024    PROMETHAZINE-DEXTROMETHORPHAN (PROMETHAZINE-DM) 6.25-15 MG/5 ML SYRP    Take 5 mLs by mouth every 8 (eight) hours as needed (cough).       Start Date: 10/15/2024End Date: 10/25/2024   Current Medications    DEXTROAMPHETAMINE-AMPHETAMINE 30 MG TAB    Take 1 tablet (30 mg total) by mouth every morning.       Start Date: 9/18/2024 End Date: 10/18/2024    FLUVOXAMINE (LUVOX) 100 MG TABLET    Take 1 tablet (100 mg total) by mouth every evening.       Start Date: 6/20/2024 End Date: --    LORATADINE (CLARITIN) 10 MG TABLET    Take 1 tablet (10 mg total) by mouth nightly as needed for Allergies.       Start Date: 3/12/2024 End Date: --    PRAZOSIN (MINIPRESS) 1 MG CAP    Take 1 mg by mouth 2 (two) times daily.       Start Date: --        End Date: --   Changed and/or Refilled Medications    Modified Medication Previous Medication    AMLODIPINE (NORVASC) 5 MG TABLET amLODIPine (NORVASC) 5 MG tablet       Take 1 tablet (5 mg total) by mouth Daily.    Take 1 tablet (5 mg total) by mouth Daily.       Start Date: 10/15/2024End Date: --    Start Date: 3/12/2024 End Date: 10/15/2024    ATORVASTATIN (LIPITOR) 20 MG TABLET atorvastatin (LIPITOR) 20 MG  tablet       Take 1 tablet (20 mg total) by mouth once daily.    Take 1 tablet (20 mg total) by mouth once daily.       Start Date: 10/15/2024End Date: 10/15/2025    Start Date: 3/12/2024 End Date: 10/15/2024    CLONAZEPAM (KLONOPIN) 0.5 MG DISINTEGRATING TABLET clonazePAM (KLONOPIN) 0.5 MG disintegrating tablet       Take 1 tablet (0.5 mg total) by mouth daily as needed (Panic attacks).    Take 1 tablet (0.5 mg total) by mouth daily as needed (Panic attacks).       Start Date: 10/18/2024End Date: 11/17/2024    Start Date: 9/18/2024 End Date: 10/15/2024    PANTOPRAZOLE (PROTONIX) 40 MG TABLET pantoprazole (PROTONIX) 40 MG tablet       Take 1 tablet (40 mg total) by mouth once daily.    Take 1 tablet (40 mg total) by mouth once daily.       Start Date: 10/15/2024End Date: 1/13/2025    Start Date: 6/14/2024 End Date: 10/15/2024          Follow up in about 3 months (around 1/15/2025) for Virtual Visit [facility]. In addition to their scheduled follow up, the patient has also been instructed to follow up on as needed basis.       Future Appointments   Date Time Provider Department Center   3/17/2025  2:30 PM Amparo Thomson MD [facility]          Answers submitted by the patient for this visit:  Cough Questionnaire (Submitted on 10/15/2024)  Chief Complaint: Cough  Chronicity: new  Onset: yesterday  Progression since onset: rapidly worsening  Frequency: every few minutes  Cough characteristics: productive of purulent sputum  ear congestion: Yes  nasal congestion: No  postnasal drip: Yes  rhinorrhea: No  sweats: No  Aggravated by: nothing  Risk factors for lung disease: travel  asthma: No  bronchiectasis: No  bronchitis: Yes  COPD: No  emphysema: No  pneumonia: No  Treatments tried: OTC cough suppressant, body position changes, rest  Improvement on treatment: no relief

## 2024-10-29 ENCOUNTER — E-VISIT (OUTPATIENT)
Dept: FAMILY MEDICINE | Facility: CLINIC | Age: 44
End: 2024-10-29

## 2024-10-29 ENCOUNTER — PATIENT MESSAGE (OUTPATIENT)
Dept: FAMILY MEDICINE | Facility: CLINIC | Age: 44
End: 2024-10-29

## 2024-10-29 DIAGNOSIS — R05.2 SUBACUTE COUGH: Primary | ICD-10-CM

## 2024-10-29 DIAGNOSIS — J01.00 SUBACUTE MAXILLARY SINUSITIS: ICD-10-CM

## 2024-10-29 RX ORDER — DOXYCYCLINE HYCLATE 100 MG
100 TABLET ORAL 2 TIMES DAILY
Qty: 14 TABLET | Refills: 0 | Status: SHIPPED | OUTPATIENT
Start: 2024-10-29 | End: 2024-11-05

## 2024-10-29 RX ORDER — OXYMETAZOLINE HCL 0.05 %
2 SPRAY, NON-AEROSOL (ML) NASAL 2 TIMES DAILY
Qty: 6 ML | Refills: 0 | Status: SHIPPED | OUTPATIENT
Start: 2024-10-29 | End: 2024-11-01

## 2024-10-29 RX ORDER — PROMETHAZINE HYDROCHLORIDE AND DEXTROMETHORPHAN HYDROBROMIDE 6.25; 15 MG/5ML; MG/5ML
5 SYRUP ORAL EVERY 6 HOURS PRN
Qty: 118 ML | Refills: 1 | Status: SHIPPED | OUTPATIENT
Start: 2024-10-29 | End: 2024-11-08

## 2024-11-06 DIAGNOSIS — R06.2 WHEEZE: ICD-10-CM

## 2024-11-06 RX ORDER — ALBUTEROL SULFATE 90 UG/1
2 INHALANT RESPIRATORY (INHALATION) EVERY 6 HOURS PRN
Qty: 18 G | Refills: 0 | Status: SHIPPED | OUTPATIENT
Start: 2024-11-06 | End: 2025-11-06

## 2024-12-04 ENCOUNTER — PATIENT MESSAGE (OUTPATIENT)
Dept: FAMILY MEDICINE | Facility: CLINIC | Age: 44
End: 2024-12-04
Payer: COMMERCIAL

## 2024-12-06 ENCOUNTER — OFFICE VISIT (OUTPATIENT)
Dept: FAMILY MEDICINE | Facility: CLINIC | Age: 44
End: 2024-12-06
Payer: COMMERCIAL

## 2024-12-06 ENCOUNTER — TELEPHONE (OUTPATIENT)
Dept: FAMILY MEDICINE | Facility: CLINIC | Age: 44
End: 2024-12-06

## 2024-12-06 VITALS
WEIGHT: 186 LBS | SYSTOLIC BLOOD PRESSURE: 148 MMHG | HEART RATE: 92 BPM | DIASTOLIC BLOOD PRESSURE: 99 MMHG | BODY MASS INDEX: 35.14 KG/M2

## 2024-12-06 DIAGNOSIS — J33.9 NASAL POLYP: ICD-10-CM

## 2024-12-06 DIAGNOSIS — E66.01 SEVERE OBESITY (BMI 35.0-39.9) WITH COMORBIDITY: ICD-10-CM

## 2024-12-06 DIAGNOSIS — T78.40XA ALLERGY, INITIAL ENCOUNTER: Primary | ICD-10-CM

## 2024-12-06 DIAGNOSIS — F90.2 ATTENTION DEFICIT HYPERACTIVITY DISORDER (ADHD), COMBINED TYPE: ICD-10-CM

## 2024-12-06 RX ORDER — PREDNISONE 10 MG/1
10 TABLET ORAL 2 TIMES DAILY
COMMUNITY
Start: 2024-12-03

## 2024-12-06 RX ORDER — ALBUTEROL SULFATE 0.83 MG/ML
2.5 SOLUTION RESPIRATORY (INHALATION) EVERY 4 HOURS PRN
COMMUNITY
Start: 2024-12-03

## 2024-12-06 RX ORDER — FLUVOXAMINE MALEATE 50 MG/1
100 TABLET, FILM COATED ORAL DAILY
COMMUNITY
Start: 2024-10-26

## 2024-12-06 RX ORDER — CODEINE PHOSPHATE AND GUAIFENESIN 10; 100 MG/5ML; MG/5ML
5 SOLUTION ORAL EVERY 6 HOURS PRN
COMMUNITY
Start: 2024-12-03

## 2024-12-06 RX ORDER — FLUTICASONE PROPIONATE 50 MCG
1 SPRAY, SUSPENSION (ML) NASAL DAILY
Qty: 16 ML | Refills: 0 | Status: SHIPPED | OUTPATIENT
Start: 2024-12-06 | End: 2025-12-06

## 2024-12-06 RX ORDER — AZITHROMYCIN 250 MG/1
250 TABLET, FILM COATED ORAL ONCE
COMMUNITY
Start: 2024-12-04

## 2024-12-06 RX ORDER — LEVOCETIRIZINE DIHYDROCHLORIDE 5 MG/1
5 TABLET, FILM COATED ORAL NIGHTLY
Qty: 30 TABLET | Refills: 0 | Status: SHIPPED | OUTPATIENT
Start: 2024-12-06 | End: 2025-01-05

## 2024-12-06 RX ORDER — DEXTROAMPHETAMINE SACCHARATE, AMPHETAMINE ASPARTATE, DEXTROAMPHETAMINE SULFATE AND AMPHETAMINE SULFATE 7.5; 7.5; 7.5; 7.5 MG/1; MG/1; MG/1; MG/1
30 TABLET ORAL EVERY MORNING
Qty: 30 TABLET | Refills: 0 | Status: SHIPPED | OUTPATIENT
Start: 2024-12-06 | End: 2025-01-05

## 2024-12-06 NOTE — TELEPHONE ENCOUNTER
Pt wants to know if it is time to get a 90 day supply of Adderall. Pt voiced that she forgot to mention it at her virtual appt today. If it is time for her Adderall she would like for it to be sent to [facility] in [place].

## 2024-12-06 NOTE — TELEPHONE ENCOUNTER
----- Message from [employer] sent at 12/6/2024  9:54 AM CST -----  .Type:  Patient Returning Call    Who Called:pt  Who Left Message for Patient:pt  Does the patient know what this is regarding?:missed call   Would the patient rather a call back or a response via [facility]?   Best Call Back Number:[phone]  Additional Information: Please call back missed call. Also have questions about adderall

## 2024-12-06 NOTE — PROGRESS NOTES
Patient ID: [id number]     Chief Complaint: No chief complaint on file.      HPI:     This is a telemedicine note. Patient was treated using telemedicine, real time audio and video, according to [facility] protocols. I, Amparo Thomson MD, conducted the visit from the [facility]. The patient participated in the visit at a non-[facility] location selected by the patient, identified below. I am licensed in the state where the patient stated they are located. The patient stated that they understood and accepted the privacy and security risks to their information at their location. This visit is not recorded.    Patient was located at the patient's home.       Fadia Thorpe is a 44 y.o. female here today for a telemedicine visit.     Patient reports that was down with a viral infection and then it got worse where she visited urgent care and got Rocephin shot.  Also reports that when she was needing she had a big piece of masslike lesion coming out from her nose felt like nasal polyp.  Would like to get evaluated by ENT    -------------------------------------    Attention deficit hyperactivity disorder (ADHD), predominantly inattentive type    Gastroesophageal reflux disease    Generalized anxiety disorder    Major depressive disorder    Obesity    Raynaud's disease    Seasonal allergic rhinitis        Past Surgical History:   Procedure Laterality Date    abdominal plasty      BREAST BIOPSY  06/04/2019    BREAST SURGERY  12/4/2023    Breast reduction    COSMETIC SURGERY  12/4/2023    Liposuction 360 and abdominoplasty    HYSTERECTOMY  06/13/2019    lyposuction      REDUCTION OF BOTH BREASTS Bilateral        Review of patient's allergies indicates:  No Known Allergies    Current Outpatient Medications   Medication Instructions    albuterol (VENTOLIN HFA) 90 mcg/actuation inhaler 2 puffs, Inhalation, Every 6 hours PRN, Rescue    amLODIPine (NORVASC) 5 mg, Oral, Daily    atorvastatin (LIPITOR) 20 mg, Oral, Daily     clonazePAM (KLONOPIN) 0.5 mg, Oral, Daily PRN    dextroamphetamine-amphetamine 30 mg Tab 30 mg, Oral, Every morning    fluticasone propionate (FLONASE) 50 mcg, Each Nostril, Daily    fluvoxaMINE (LUVOX) 100 mg, Oral, Nightly    levocetirizine (XYZAL) 5 mg, Oral, Nightly    loratadine (CLARITIN) 10 mg, Oral, Nightly PRN    pantoprazole (PROTONIX) 40 mg, Oral, Daily    prazosin (MINIPRESS) 1 mg, Oral, 2 times daily       Social History     Socioeconomic History    Marital status:    Tobacco Use    Smoking status: Never     Passive exposure: Never    Smokeless tobacco: Never   Substance and Sexual Activity    Alcohol use: Yes     Alcohol/week: 10.0 standard drinks of alcohol     Types: 10 Glasses of wine per week     Comment: Socially or to unwind    Drug use: Never    Sexual activity: Yes     Partners: Male     Birth control/protection: See Surgical Hx     Social Drivers of Health     Financial Resource Strain: Low Risk  (1/16/2024)    Overall Financial Resource Strain (CARDIA)     Difficulty of Paying Living Expenses: Not hard at all   Food Insecurity: No Food Insecurity (1/16/2024)    Hunger Vital Sign     Worried About Running Out of Food in the Last Year: Never true     Ran Out of Food in the Last Year: Never true   Transportation Needs: No Transportation Needs (1/16/2024)    PRAPARE - Transportation     Lack of Transportation (Medical): No     Lack of Transportation (Non-Medical): No   Physical Activity: Unknown (1/16/2024)    Exercise Vital Sign     Days of Exercise per Week: 0 days   Stress: No Stress Concern Present (1/16/2024)    [employer] [place] [employer] - Occupational Stress Questionnaire     Feeling of Stress : Not at all   Housing Stability: Low Risk  (1/16/2024)    Housing Stability Vital Sign     Unable to Pay for Housing in the Last Year: No     Number of Places Lived in the Last Year: 1     Unstable Housing in the Last Year: No        Family History   Problem Relation Name Age of  Onset    Coronary artery disease Mother Josefa     Heart attack Mother Josefa 40    COPD Mother Josefa     Depression Mother Josefa     Diabetes Mother Josefa     Heart disease Mother Josefa     Hyperlipidemia Mother Josefa     Mental illness Mother Josefa         Bipolar    Coronary artery disease Father Reggis Sr.     COPD Father Reggis Sr.     Heart disease Father Reggis Sr.     Hyperlipidemia Father Reggis Sr.     Hypertension Father Reggis Sr.     Kidney disease Father Reggis Sr.     Alcohol abuse Father Reggis Sr.     Stroke Father Reggis Sr.     Cancer Maternal Aunt Millie     Depression Brother CJ     Mental illness Brother CJ     Drug abuse Brother CJ     Depression Sister Pilar     Depression Brother Reggis     Diabetes Brother Reggis     Early death Brother Reggis         Covid age 45    Diabetes Brother Messi     Drug abuse Sister Veronica     Alcohol abuse Sister Franchesca     Mental illness Sister Franchesca     Stroke Sister Shelia         Patient Care Team:  Amparo Thomson MD as PCP - General (Family Medicine)  Amparo Thomson MD as PCP - Family Medicine (Family Medicine)  Penny Figueroa MD (Obstetrics and Gynecology)      Subjective:       ROS    See HPI for details    Constitutional: Denies Change in appetite. Denies Chills. Denies Fever. Denies Night sweats.  Eye: Denies Blurred vision. Denies Discharge. Denies Eye pain.  ENT: Denies Decreased hearing. Denies Sore throat. Denies Swollen glands.  Respiratory: Denies Cough. Denies Shortness of breath. Denies Shortness of breath with exertion. Denies Wheezing.  Cardiovascular: DeniesChest pain at rest. Denies Chest pain with exertion. Denies Irregular heartbeat. Denies Palpitations. Denies Edema.  Gastrointestinal: Denies Abdominal pain. DeniesDiarrhea. Denies Nausea. Denies Vomiting. Denies Hematemesis or Hematochezia.  Genitourinary: Denies Dysuria. Denies Urinary frequency. Denies Urinary urgency. Denies Blood in urine.  Endocrine: Denies Cold intolerance.  Denies Excessive thirst. Denies Heat intolerance. Denies Weight loss. Denies Weight gain.  Musculoskeletal: Denies Painful joints. Denies Weakness.  Integumentary: Denies Rash. Denies Itching. Denies Dry skin.  Neurologic: Denies Dizziness. Denies Fainting. Denies Headache.  Psychiatric: Denies Depression. Denies Anxiety. Denies Suicidal/Homicidal ideations.    All Other ROS: Negative except as stated in HPI.       Objective:     Visit Vitals  BP (!) 148/99 (BP Location: Left arm, Patient Position: Sitting)   Pulse 92   Wt 84.4 kg (186 lb)   BMI 35.14 kg/m²       Physical Exam    Physical Exam: LIMITED DUE TO TELEMEDICINE RESTRICTIONS.  General: Alert and oriented, No acute distress.  Head: Normocephalic, Atraumatic.  Eye: Sclera non-icteric.  Neck/Thyroid:  Full range of motion.  Respiratory: Non-labored respirations, Symmetrical chest wall expansion.  Musculoskeletal: Normal range of motion.  Integumentary:  No visible suspicious lesions or rashes. No diaphoresis.   Neurologic: No focal deficits  Psychiatric: Normal interaction, Coherent speech, Euthymic mood, Appropriate affect       Assessment:       ICD-10-CM ICD-9-CM   1. Allergy, initial encounter  T78.40XA 995.3   2. Nasal polyp  J33.9 471.9   3. Severe obesity (BMI 35.0-39.9) with comorbidity  E66.01 278.01   4. Attention deficit hyperactivity disorder (ADHD), combined type  F90.2 314.01        Plan:     1. Allergy, initial encounter  -     fluticasone propionate (FLONASE) 50 mcg/actuation nasal spray; 1 spray (50 mcg total) by Each Nostril route once daily.  Dispense: 16 mL; Refill: 0  -     levocetirizine (XYZAL) 5 MG tablet; Take 1 tablet (5 mg total) by mouth every evening.  Dispense: 30 tablet; Refill: 0    2. Nasal polyp  -     Ambulatory referral/consult to ENT; Future; Expected date: 12/13/2024    3. Severe obesity (BMI 35.0-39.9) with comorbidity  Body mass index is 35.14 kg/m².  Goal BMI <30.  Exercise 5 times a week for 30 minutes per day.  Avoid  soda, simple sugars, excessive rice, potatoes or bread. Limit fast foods and fried foods.  Choose complex carbs in moderation (example: green vegetables, beans, oatmeal). Eat plenty of fresh fruits and vegetables with lean meats daily.  Do not skip meals. Eat a balanced portion size.  Avoid fad diets. Consider permanent healthy life style changes.      4. ADHD  -Continue medication regimen as prescribed  -Rx refilled with appropriate fill dates  - reviewed  -Scheduled drug contract reviewed  -Medication side effect profile discussed at length  -if experiencing any shortness a breath, chest pain, palpitations, dizziness, loss of consciousness does not discontinue the medication and report to ER    -Medication is teratogenic.  Recommend the patient to not get pregnant while on medication.  If gets pregnant quit taking medication in report to MD        Medication List with Changes/Refills   New Medications    FLUTICASONE PROPIONATE (FLONASE) 50 MCG/ACTUATION NASAL SPRAY    1 spray (50 mcg total) by Each Nostril route once daily.       Start Date: 12/6/2024 End Date: 12/6/2025    LEVOCETIRIZINE (XYZAL) 5 MG TABLET    Take 1 tablet (5 mg total) by mouth every evening.       Start Date: 12/6/2024 End Date: 1/5/2025   Current Medications    ALBUTEROL (VENTOLIN HFA) 90 MCG/ACTUATION INHALER    Inhale 2 puffs into the lungs every 6 (six) hours as needed for Wheezing. Rescue       Start Date: 11/6/2024 End Date: 11/6/2025    AMLODIPINE (NORVASC) 5 MG TABLET    Take 1 tablet (5 mg total) by mouth Daily.       Start Date: 10/15/2024End Date: --    ATORVASTATIN (LIPITOR) 20 MG TABLET    Take 1 tablet (20 mg total) by mouth once daily.       Start Date: 10/15/2024End Date: 10/15/2025    CLONAZEPAM (KLONOPIN) 0.5 MG DISINTEGRATING TABLET    Take 1 tablet (0.5 mg total) by mouth daily as needed (Panic attacks).       Start Date: 10/18/2024End Date: 11/17/2024    DEXTROAMPHETAMINE-AMPHETAMINE 30 MG TAB    Take 1 tablet (30 mg  total) by mouth every morning.       Start Date: 9/18/2024 End Date: 10/18/2024    FLUVOXAMINE (LUVOX) 100 MG TABLET    Take 1 tablet (100 mg total) by mouth every evening.       Start Date: 6/20/2024 End Date: --    LORATADINE (CLARITIN) 10 MG TABLET    Take 1 tablet (10 mg total) by mouth nightly as needed for Allergies.       Start Date: 3/12/2024 End Date: --    PANTOPRAZOLE (PROTONIX) 40 MG TABLET    Take 1 tablet (40 mg total) by mouth once daily.       Start Date: 10/15/2024End Date: 1/13/2025    PRAZOSIN (MINIPRESS) 1 MG CAP    Take 1 mg by mouth 2 (two) times daily.       Start Date: --        End Date: --          Follow up in about 3 months (around 3/6/2025) for Virtual Visit. In addition to their scheduled follow up, the patient has also been instructed to follow up on as needed basis.       Future Appointments   Date Time Provider Department Center   1/16/2025  3:15 PM Amparo Thomson MD [facility]    3/17/2025  2:30 PM Amparo Thomson MD [facility]        Answers submitted by the patient for this visit:  Review of Systems Questionnaire (Submitted on 12/6/2024)  activity change: No  unexpected weight change: No  rhinorrhea: Yes  trouble swallowing: No  visual disturbance: No  chest tightness: No  polyuria: No  difficulty urinating: No  menstrual problem: No  joint swelling: No  arthralgias: No  confusion: No  dysphoric mood: No

## 2024-12-13 ENCOUNTER — PATIENT OUTREACH (OUTPATIENT)
Facility: CLINIC | Age: 44
End: 2024-12-13
Payer: COMMERCIAL

## 2024-12-13 NOTE — PROGRESS NOTES
Population Health Outreach.    Call to patient no answer, following up for remote BP.  I will send a Portal message requesting this.  Labs are pending AW appt.  Requested mammogram 4/18/2024 [facility]

## 2024-12-13 NOTE — LETTER
"       AUTHORIZATION FOR RELEASE OF   CONFIDENTIAL INFORMATION    Dear Staff [facility],    We are seeing Fadia Thorpe, date of birth 1980, in the clinic at [facility]. Amparo Thosmon MD is the patient's PCP. Fadia Thorpe has an outstanding lab/procedure at the time we reviewed her chart. In order to help keep her health information updated, she has authorized us to request the following medical record(s):                                         ( xx )  MAMMOGRAM   2024                                             Please fax records to [facility], Choudhary, Shalini, MD, . [phone]  Attn: Angela        If you have any questions, please contact Shelbi Gilmore" [facility] Coordinator @ [phone]      Patient Name: Fadia Thorpe  : 1980  Patient Phone #: [phone]                                               Fadia Wilks  MRN: [id number]  : 1980  Age: 41 y.o.  Sex: female         Patient/Legal Guardian Signature  This signature was collected at 2022    Fadia Wilks       _______________________________   Printed Name/Relationship to Patient      Consent for Examination and Treatment: I hereby authorize the providers and employees of [facility] ([facility]) to provide medical treatment/services which includes, but is not limited to, performing and administering tests and diagnostic procedures that are deemed necessary, including, but not limited to, imaging examinations, blood tests and other laboratory procedures as may be required by the hospital, clinic, or may be ordered by my physician(s) or persons working under the general and/or special instructions of my physician(s).      I understand and agree that this consent covers all authorized persons, including but not limited to physicians, residents, nurse practitioners, physicians' assistants, specialists, consultants, student nurses, and independently contracted physicians, who are " called upon by the physician in charge, to carry out the diagnostic procedures and medical or surgical treatment.     I hereby authorize [facility] to retain or dispose of any specimens or tissue, should there be such remaining from any test or procedure.     I hereby authorize and give consent for [facility] providers and employees to take photographs, images or videotapes of such diagnostic, surgical or treatment procedures of Patient as may be required by [facility] or as may be ordered by a physician. I further acknowledge and agree that [facility] may use cameras or other devices for patient monitoring.     I am aware that the practice of medicine is not an exact science, and I acknowledge that no guarantees have been made to me as to the outcome of any tests, procedures or treatment.     Authorization for Release of Information: I understand that my insurance company and/or their agents may need information necessary to make determinations about payment/reimbursement. I hereby provide authorization to release to all insurance companies, their successors, assignees, other parties with whom they may have contracted, or others acting on their behalf, that are involved with payment for any hospital and/or clinic charges incurred by the patient, any information that they request and deem necessary for payment/reimbursement, and/or quality review.  I further authorize the release of my health information to physicians or other health care practitioners on staff who are involved in my health care now and in the future, and to other health care providers, entities, or institutions for the purpose of my continued care and treatment, including referrals.     REGISTRATION AUTHORIZATION  Form No. 98089 (Rev. 7/20/2020)       Medicare Patient's Certification and Authorization to Release Information and Payment Request:  I certify that the information given by me in applying for payment under Title XVIII of the Social Security Act is  correct. I authorize any davey of medical or other information about me to release to the [facility], or its intermediaries or carriers, any information needed for this or a related Medicare claim. I request that payment of authorized benefits be made on my behalf.     Assignment of Insurance Benefits:   I hereby authorize any and all insurance companies, health plans, defined   benefit plans, health insurers or any entity that is or may be responsible for payment of my medical expenses to pay all hospital and medical benefits now due, and to become due and payable to me under any hospital benefits, sick benefits, injury benefits or any other benefit for services rendered to me, including Major Medical Benefits, direct to [facility] and all independently contracted physicians. I assign any and all rights that I may have against any and all insurance companies, health plans, defined benefit plans, health insurers or any entity that is or may be responsible for payment of my medical expenses, including, but not limited to any right to appeal a denial of a claim, any right to bring any action, lawsuit, administrative proceeding, or other cause of action on my behalf. I specifically assign my right to pursue litigation against any and all insurance companies, health plans, defined benefit plans, health insurers or any entity that is or may be responsible for payment of my medical expenses based upon a refusal to pay charges.            E. Valuables: It is understood and agreed that [facility] is not liable for the damage to or loss of any money, jewelry,   documents, dentures, eye glasses, hearing aids, prosthetics, or other property of value.     F. Computer Equipment: I understand and agree that should I choose to use computer equipment owned by [facility] or if I choose to access the Internet via [facility]s network, I do so at my own risk. [facility] is not responsible for any damage to my computer equipment  or to any damages of any type that might arise from my loss of equipment or data.     G. Acceptance of Financial Responsibility:  I agree that in consideration of the services and   supplies that have been   or will be furnished to the patient, I am hereby obligated to pay all charges made for or on the account of the patient according to the standard rates (in effect at the time the services and supplies are delivered) established by [facility], including its Patient Financial Assistance Policy to the extent it is applicable. I understand that I am responsible for all charges, or portions thereof, not covered by insurance or other sources. Patient refunds will be distributed only after balances at all [facility] facilities are paid.     H. Communication Authorization:  I hereby authorize [facility] and its representatives, along with any billing service   or  who may work on their behalf, to contact me on   my cell phone and/or home phone using pre- recorded messages, artificial voice messages, automatic telephone dialing devices or other computer assisted technology, or by electronic        mail, text messaging, or by any other form of electronic communication. This includes, but is not limited to, appointment reminders, yearly physical exam reminders, preventive care reminders, patient campaigns, welcome calls, and calls about account balances on my account or any account on which I am listed as a guarantor. I understand I have the right to opt out of these communications at any time.      Relationship  Between  Facility and  Provider:      I understand that some, but not all, providers furnishing services to the patient are not employees or agents of [facility]. The patient is under the care and supervision of his/her attending physician, and it is the responsibility of the facility and its nursing staff to carry out the instructions of such physicians. It is the responsibility of the patient's  physician/designee to obtain the patient's informed consent, when required, for medical or surgical treatment, special diagnostic or therapeutic procedures, or hospital services rendered for the patient under the special instructions of the physician/designee.     REGISTRATION AUTHORIZATION  Form No. 80526 (Rev. 7/20/2020)      Notice of Privacy Practices: I acknowledge I have received a copy of [facility]'s Notice of Privacy Practices.     Facility  Directory: I have discussed with the organization my desire to be either included or excluded  in the facility directory in the event of my being an inpatient at an [facility] facility. I understand that if my choice is to opt-out of being identified in the facility directory that the facility will not provide any information about me such as my condition (e.g. fair, stable, etc.) or my location in the facility (e.g., room number, department).     TERM: This authorization is valid for this and subsequent care/treatment I receive at [facility] and will remain valid unless/until revoked in writing by me.     [facility]: As used in this document, [facility] or [facility] mean all [facility] affiliated entities including all health centers, surgery centers, clinics, and hospitals. It includes more specifically, the following entities: [facility], a not for profit [facility], and its subsidiaries and affiliates, including [facility], [facility], [facility], [facility], [facility] d/b/a [facility] d/b/a [facility] [employer] as manager of [employer]olito WIL[employer] [facility], [facility], [facility], [facility]  d/b/a [facility], [facility] and [facility] as manager of [facility], [facility]), and [facility].        [facility] complies with applicable Federal civil rights laws and does not discriminate on the basis of race, color, national origin, age, disability, or sex.  ATENCIÓN: si habla marie, tiene a gomez disposición servicios gratuitos de asistencia lingüística. Armida al [phone].  CHÚ Ý: N?u b?n nói Ti?ng Vi?t, có các d?ch v? h? tr? ngôn ng? mi?n phí dành cho b?n. G?i s? [phone].     REGISTRATION AUTHORIZATION  Form No. 96314 (Rev. 7/20/2020)

## 2024-12-24 DIAGNOSIS — F41.1 GENERALIZED ANXIETY DISORDER: Chronic | ICD-10-CM

## 2024-12-24 DIAGNOSIS — F42.2 MIXED OBSESSIONAL THOUGHTS AND ACTS: Chronic | ICD-10-CM

## 2024-12-24 DIAGNOSIS — F33.1 MODERATE EPISODE OF RECURRENT MAJOR DEPRESSIVE DISORDER: Chronic | ICD-10-CM

## 2024-12-24 RX ORDER — FLUVOXAMINE MALEATE 100 MG/1
100 TABLET, COATED ORAL NIGHTLY
Qty: 30 TABLET | Refills: 3 | Status: SHIPPED | OUTPATIENT
Start: 2024-12-24

## 2025-01-13 DIAGNOSIS — T78.40XA ALLERGY, INITIAL ENCOUNTER: ICD-10-CM

## 2025-01-13 RX ORDER — LEVOCETIRIZINE DIHYDROCHLORIDE 5 MG/1
5 TABLET, FILM COATED ORAL NIGHTLY
Qty: 30 TABLET | Refills: 0 | Status: SHIPPED | OUTPATIENT
Start: 2025-01-13 | End: 2025-02-12

## 2025-01-16 ENCOUNTER — OFFICE VISIT (OUTPATIENT)
Dept: FAMILY MEDICINE | Facility: CLINIC | Age: 45
End: 2025-01-16
Payer: COMMERCIAL

## 2025-01-16 DIAGNOSIS — F33.1 MODERATE EPISODE OF RECURRENT MAJOR DEPRESSIVE DISORDER: Chronic | ICD-10-CM

## 2025-01-16 DIAGNOSIS — K21.9 GASTROESOPHAGEAL REFLUX DISEASE WITHOUT ESOPHAGITIS: ICD-10-CM

## 2025-01-16 DIAGNOSIS — F42.2 MIXED OBSESSIONAL THOUGHTS AND ACTS: Chronic | ICD-10-CM

## 2025-01-16 DIAGNOSIS — F41.1 GENERALIZED ANXIETY DISORDER: Chronic | ICD-10-CM

## 2025-01-16 DIAGNOSIS — F43.0 ACUTE STRESS DISORDER: ICD-10-CM

## 2025-01-16 DIAGNOSIS — E66.01 SEVERE OBESITY (BMI 35.0-39.9) WITH COMORBIDITY: ICD-10-CM

## 2025-01-16 DIAGNOSIS — F90.2 ATTENTION DEFICIT HYPERACTIVITY DISORDER (ADHD), COMBINED TYPE: ICD-10-CM

## 2025-01-16 PROCEDURE — 1159F MED LIST DOCD IN RCRD: CPT | Mod: CPTII,95,, | Performed by: STUDENT IN AN ORGANIZED HEALTH CARE EDUCATION/TRAINING PROGRAM

## 2025-01-16 PROCEDURE — 98006 SYNCH AUDIO-VIDEO EST MOD 30: CPT | Mod: 95,,, | Performed by: STUDENT IN AN ORGANIZED HEALTH CARE EDUCATION/TRAINING PROGRAM

## 2025-01-16 PROCEDURE — 1160F RVW MEDS BY RX/DR IN RCRD: CPT | Mod: CPTII,95,, | Performed by: STUDENT IN AN ORGANIZED HEALTH CARE EDUCATION/TRAINING PROGRAM

## 2025-01-16 RX ORDER — DEXTROAMPHETAMINE SACCHARATE, AMPHETAMINE ASPARTATE, DEXTROAMPHETAMINE SULFATE AND AMPHETAMINE SULFATE 7.5; 7.5; 7.5; 7.5 MG/1; MG/1; MG/1; MG/1
30 TABLET ORAL EVERY MORNING
Qty: 30 TABLET | Refills: 0 | Status: SHIPPED | OUTPATIENT
Start: 2025-01-16 | End: 2025-02-15

## 2025-01-16 RX ORDER — DEXTROAMPHETAMINE SACCHARATE, AMPHETAMINE ASPARTATE, DEXTROAMPHETAMINE SULFATE AND AMPHETAMINE SULFATE 7.5; 7.5; 7.5; 7.5 MG/1; MG/1; MG/1; MG/1
30 TABLET ORAL EVERY MORNING
Qty: 30 TABLET | Refills: 0 | Status: SHIPPED | OUTPATIENT
Start: 2025-02-15 | End: 2025-03-17

## 2025-01-16 RX ORDER — PANTOPRAZOLE SODIUM 40 MG/1
40 TABLET, DELAYED RELEASE ORAL DAILY
Qty: 90 TABLET | Refills: 0 | Status: SHIPPED | OUTPATIENT
Start: 2025-01-16 | End: 2025-04-16

## 2025-01-16 RX ORDER — DEXTROAMPHETAMINE SACCHARATE, AMPHETAMINE ASPARTATE, DEXTROAMPHETAMINE SULFATE AND AMPHETAMINE SULFATE 7.5; 7.5; 7.5; 7.5 MG/1; MG/1; MG/1; MG/1
30 TABLET ORAL EVERY MORNING
Qty: 30 TABLET | Refills: 0 | Status: SHIPPED | OUTPATIENT
Start: 2025-03-14 | End: 2025-04-13

## 2025-01-16 RX ORDER — CLONAZEPAM 0.5 MG/1
0.5 TABLET ORAL DAILY PRN
Qty: 30 TABLET | Refills: 0 | Status: SHIPPED | OUTPATIENT
Start: 2025-01-16 | End: 2025-02-15

## 2025-01-16 RX ORDER — FLUVOXAMINE MALEATE 100 MG/1
100 TABLET, COATED ORAL NIGHTLY
Qty: 30 TABLET | Refills: 3 | Status: SHIPPED | OUTPATIENT
Start: 2025-01-16

## 2025-01-16 NOTE — PROGRESS NOTES
Patient ID: [id number]     Chief Complaint: No chief complaint on file.      HPI:     This is a telemedicine note. Patient was treated using telemedicine, real time audio and video, according to [facility] protocols. I, Amparo Thomson MD, conducted the visit from the [facility]. The patient participated in the visit at a non-[facility] location selected by the patient, identified below. I am licensed in the state where the patient stated they are located. The patient stated that they understood and accepted the privacy and security risks to their information at their location. This visit is not recorded.    Patient was located at the patient's home.       Fadia Thorpe is a 44 y.o. female here today for a telemedicine visit.   Acute stress disorder   Anxiety   Panic attacks   Depression  Well-controlled on fluoxetine and wants to switch care to me   Would like to get a refill on medication  Denies of suicidal/homicidal    ADHD   Well-controlled on medications prescribed   Denies of any medication side effects    Acid reflux   Bad as of now because has been not eating healthy   Would like to get refills for Protonix         -------------------------------------    Attention deficit hyperactivity disorder (ADHD), predominantly inattentive type    Gastroesophageal reflux disease    Generalized anxiety disorder    Major depressive disorder    Nasal polyp, unspecified    Obesity    Raynaud's disease    Seasonal allergic rhinitis        Past Surgical History:   Procedure Laterality Date    abdominal plasty      BREAST BIOPSY  06/04/2019    BREAST SURGERY  12/4/2023    Breast reduction    COSMETIC SURGERY  12/4/2023    Liposuction 360 and abdominoplasty    HYSTERECTOMY  06/13/2019    lyposuction      REDUCTION OF BOTH BREASTS Bilateral        Review of patient's allergies indicates:  No Known Allergies    Current Outpatient Medications   Medication Instructions    albuterol (PROVENTIL) 2.5 mg, Every 4 hours PRN     albuterol (VENTOLIN HFA) 90 mcg/actuation inhaler 2 puffs, Inhalation, Every 6 hours PRN, Rescue    amLODIPine (NORVASC) 5 mg, Oral, Daily    atorvastatin (LIPITOR) 20 mg, Oral, Daily    azithromycin (Z-CIARA) 250 mg, Once    clonazePAM (KLONOPIN) 0.5 mg, Oral, Daily PRN    dextroamphetamine-amphetamine 30 mg Tab 30 mg, Oral, Every morning    fluticasone propionate (FLONASE) 50 mcg, Each Nostril, Daily    fluvoxaMINE (LUVOX) 100 mg, Daily    fluvoxaMINE (LUVOX) 100 mg, Oral, Nightly    guaiFENesin-codeine 100-10 mg/5 ml (TUSSI-ORGANIDIN NR)  mg/5 mL syrup 5 mLs, Every 6 hours PRN    levocetirizine (XYZAL) 5 mg, Oral, Nightly    loratadine (CLARITIN) 10 mg, Oral, Nightly PRN    pantoprazole (PROTONIX) 40 mg, Oral, Daily    prazosin (MINIPRESS) 1 mg, 2 times daily    predniSONE (DELTASONE) 10 mg, 2 times daily       Social History     Socioeconomic History    Marital status:    Tobacco Use    Smoking status: Never     Passive exposure: Never    Smokeless tobacco: Never   Substance and Sexual Activity    Alcohol use: Yes     Alcohol/week: 10.0 standard drinks of alcohol     Types: 10 Glasses of wine per week     Comment: Socially or to unwind    Drug use: Never    Sexual activity: Yes     Partners: Male     Birth control/protection: See Surgical Hx     Social Drivers of Health     Financial Resource Strain: Low Risk  (1/16/2024)    Overall Financial Resource Strain (CARDIA)     Difficulty of Paying Living Expenses: Not hard at all   Food Insecurity: No Food Insecurity (1/16/2024)    Hunger Vital Sign     Worried About Running Out of Food in the Last Year: Never true     Ran Out of Food in the Last Year: Never true   Transportation Needs: No Transportation Needs (1/16/2024)    PRAPARE - Transportation     Lack of Transportation (Medical): No     Lack of Transportation (Non-Medical): No   Physical Activity: Unknown (1/16/2024)    Exercise Vital Sign     Days of Exercise per Week: 0 days   Stress: No Stress  Concern Present (1/16/2024)    [employer] - Occupational Stress Questionnaire     Feeling of Stress : Not at all   Housing Stability: Low Risk  (1/16/2024)    Housing Stability Vital Sign     Unable to Pay for Housing in the Last Year: No     Number of Places Lived in the Last Year: 1     Unstable Housing in the Last Year: No        Family History   Problem Relation Name Age of Onset    Coronary artery disease Mother Josefa     Heart attack Mother Josefa 40    COPD Mother Josefa     Depression Mother Josefa     Diabetes Mother Josefa     Heart disease Mother Josefa     Hyperlipidemia Mother Josefa     Mental illness Mother Josefa         Bipolar    Coronary artery disease Father Reggis Sr.     COPD Father Reggis Sr.     Heart disease Father Reggis Sr.     Hyperlipidemia Father Reggis Sr.     Hypertension Father Reggis Sr.     Kidney disease Father Reggis Sr.     Alcohol abuse Father Reggis Sr.     Stroke Father Reggis Sr.     Cancer Maternal Aunt Millie     Depression Brother CJ     Mental illness Brother CJ     Drug abuse Brother CJ     Depression Sister Pilar     Depression Brother Reggis     Diabetes Brother Reggis     Early death Brother Reggis         Covid age 45    Diabetes Brother Messi     Drug abuse Sister Veronica     Alcohol abuse Sister Franchesca     Mental illness Sister Franchesca     Stroke Sister Shelia         Patient Care Team:  Amparo Thomson MD as PCP - General (Family Medicine)  Amparo Thomson MD as PCP - Family Medicine (Family Medicine)  Penny Figueroa MD (Obstetrics and Gynecology)  Shelbi Nix LPN as Care Coordinator      Subjective:       ROS    See HPI for details    Constitutional: Denies Change in appetite. Denies Chills. Denies Fever. Denies Night sweats.  Eye: Denies Blurred vision. Denies Discharge. Denies Eye pain.  ENT: Denies Decreased hearing. Denies Sore throat. Denies Swollen glands.  Respiratory: Denies Cough. Denies Shortness of breath. Denies  Shortness of breath with exertion. Denies Wheezing.  Cardiovascular: DeniesChest pain at rest. Denies Chest pain with exertion. Denies Irregular heartbeat. Denies Palpitations. Denies Edema.  Gastrointestinal: Denies Abdominal pain. DeniesDiarrhea. Denies Nausea. Denies Vomiting. Denies Hematemesis or Hematochezia.  Genitourinary: Denies Dysuria. Denies Urinary frequency. Denies Urinary urgency. Denies Blood in urine.  Endocrine: Denies Cold intolerance. Denies Excessive thirst. Denies Heat intolerance. Denies Weight loss. Denies Weight gain.  Musculoskeletal: Denies Painful joints. Denies Weakness.  Integumentary: Denies Rash. Denies Itching. Denies Dry skin.  Neurologic: Denies Dizziness. Denies Fainting. Denies Headache.  Psychiatric: Denies Depression. Denies Anxiety. Denies Suicidal/Homicidal ideations.    All Other ROS: Negative except as stated in HPI.       Objective:     There were no vitals taken for this visit.    Physical Exam    Physical Exam: LIMITED DUE TO TELEMEDICINE RESTRICTIONS.  General: Alert and oriented, obese No acute distress.  Head: Normocephalic, Atraumatic.  Eye: Sclera non-icteric.  Neck/Thyroid:  Full range of motion.  Respiratory: Non-labored respirations, Symmetrical chest wall expansion.  Musculoskeletal: Normal range of motion.  Integumentary:  No visible suspicious lesions or rashes. No diaphoresis.   Neurologic: No focal deficits  Psychiatric: Normal interaction, Coherent speech, Euthymic mood, Appropriate affect       Assessment:       ICD-10-CM ICD-9-CM   1. Moderate episode of recurrent major depressive disorder  F33.1 296.32   2. Generalized anxiety disorder  F41.1 300.02   3. Mixed obsessional thoughts and acts  F42.2 300.3   4. Severe obesity (BMI 35.0-39.9) with comorbidity  E66.01 278.01   5. Acute stress disorder  F43.0 308.3   6. Attention deficit hyperactivity disorder (ADHD), combined type  F90.2 314.01   7. Gastroesophageal reflux disease without esophagitis  K21.9  530.81        Plan:     1. Moderate episode of recurrent major depressive disorder  2. Generalized anxiety disorder  3. Mixed obsessional thoughts and acts  5. Acute stress disorder  -     fluvoxaMINE (LUVOX) 100 MG tablet; Take 1 tablet (100 mg total) by mouth every evening.  Dispense: 30 tablet; Refill: 3  -     Drug Screen, Urine  Well-controlled   Continue Rx as prescribed   Continue mental health exercises like yoga  Denies for suicidal/homicidal symptoms  ER precautions provided  Abstain from recreational drugs and alcohol.  Present to ED or call 911 for SI/HI plan or intent, psychosis, or medical emergency.  Status post hysterectomy    -     fluvoxaMINE (LUVOX) 100 MG tablet; Take 1 tablet (100 mg total) by mouth every evening.  Dispense: 30 tablet; Refill: 3  -     clonazePAM (KLONOPIN) 0.5 MG tablet; Take 1 tablet (0.5 mg total) by mouth daily as needed for Anxiety.  Dispense: 30 tablet; Refill: 0      4. Severe obesity (BMI 35.0-39.9) with comorbidity  There is no height or weight on file to calculate BMI.  Goal BMI <30.  Exercise 5 times a week for 30 minutes per day.  Avoid soda, simple sugars, excessive rice, potatoes or bread. Limit fast foods and fried foods.  Choose complex carbs in moderation (example: green vegetables, beans, oatmeal). Eat plenty of fresh fruits and vegetables with lean meats daily.  Do not skip meals. Eat a balanced portion size.  Avoid fad diets. Consider permanent healthy life style changes.         6. Attention deficit hyperactivity disorder (ADHD), combined type  -Continue medication regimen as prescribed  -Rx refilled with appropriate fill dates  - reviewed  -Scheduled drug contract reviewed  -Medication side effect profile discussed at length  -if experiencing any shortness a breath, chest pain, palpitations, dizziness, loss of consciousness does not discontinue the medication and report to ER    -Medication is teratogenic.  Recommend the patient to not get pregnant while  on medication.  If gets pregnant quit taking medication in report to MD      -     dextroamphetamine-amphetamine 30 mg Tab; Take 1 tablet (30 mg total) by mouth every morning.  Dispense: 30 tablet; Refill: 0  -     dextroamphetamine-amphetamine 30 mg Tab; Take 1 tablet (30 mg total) by mouth every morning.  Dispense: 30 tablet; Refill: 0  -     dextroamphetamine-amphetamine 30 mg Tab; Take 1 tablet (30 mg total) by mouth every morning.  Dispense: 30 tablet; Refill: 0    7. Gastroesophageal reflux disease without esophagitis  -     pantoprazole (PROTONIX) 40 MG tablet; Take 1 tablet (40 mg total) by mouth once daily.  Dispense: 90 tablet; Refill: 0     PPI as prescribed  Avoid high greasy, minty, chocolate , spicy, acidic, fried foods and alcohol.  Reduce caffeine intake, avoid soda.Recommend to take last meal no later than 6 PM  Avoid tight clothing, chew food thoroughly.        Medication List with Changes/Refills   Current Medications    ALBUTEROL (PROVENTIL) 2.5 MG /3 ML (0.083 %) NEBULIZER SOLUTION    Take 2.5 mg by nebulization every 4 (four) hours as needed.       Start Date: 12/3/2024 End Date: --    ALBUTEROL (VENTOLIN HFA) 90 MCG/ACTUATION INHALER    Inhale 2 puffs into the lungs every 6 (six) hours as needed for Wheezing. Rescue       Start Date: 11/6/2024 End Date: 11/6/2025    AMLODIPINE (NORVASC) 5 MG TABLET    Take 1 tablet (5 mg total) by mouth Daily.       Start Date: 10/15/2024End Date: --    ATORVASTATIN (LIPITOR) 20 MG TABLET    Take 1 tablet (20 mg total) by mouth once daily.       Start Date: 10/15/2024End Date: 10/15/2025    AZITHROMYCIN (Z-CIARA) 250 MG TABLET    Take 250 mg by mouth once.       Start Date: 12/4/2024 End Date: --    CLONAZEPAM (KLONOPIN) 0.5 MG DISINTEGRATING TABLET    Take 1 tablet (0.5 mg total) by mouth daily as needed (Panic attacks).       Start Date: 10/18/2024End Date: 12/6/2024    DEXTROAMPHETAMINE-AMPHETAMINE 30 MG TAB    Take 1 tablet (30 mg total) by mouth every  morning.       Start Date: 12/6/2024 End Date: 1/5/2025    FLUTICASONE PROPIONATE (FLONASE) 50 MCG/ACTUATION NASAL SPRAY    1 spray (50 mcg total) by Each Nostril route once daily.       Start Date: 12/6/2024 End Date: 12/6/2025    FLUVOXAMINE (LUVOX) 100 MG TABLET    Take 1 tablet (100 mg total) by mouth every evening.       Start Date: 12/24/2024End Date: --    FLUVOXAMINE (LUVOX) 50 MG TAB TABLET    Take 100 mg by mouth once daily.       Start Date: 10/26/2024End Date: --    GUAIFENESIN-CODEINE 100-10 MG/5 ML (TUSSI-ORGANIDIN NR)  MG/5 ML SYRUP    Take 5 mLs by mouth every 6 (six) hours as needed.       Start Date: 12/3/2024 End Date: --    LEVOCETIRIZINE (XYZAL) 5 MG TABLET    Take 1 tablet (5 mg total) by mouth every evening.       Start Date: 1/13/2025 End Date: 2/12/2025    LORATADINE (CLARITIN) 10 MG TABLET    Take 1 tablet (10 mg total) by mouth nightly as needed for Allergies.       Start Date: 3/12/2024 End Date: --    PANTOPRAZOLE (PROTONIX) 40 MG TABLET    Take 1 tablet (40 mg total) by mouth once daily.       Start Date: 10/15/2024End Date: 1/13/2025    PRAZOSIN (MINIPRESS) 1 MG CAP    Take 1 mg by mouth 2 (two) times daily.       Start Date: --        End Date: --    PREDNISONE (DELTASONE) 10 MG TABLET    Take 10 mg by mouth 2 (two) times daily.       Start Date: 12/3/2024 End Date: --          Follow up for Annual Wellness. In addition to their scheduled follow up, the patient has also been instructed to follow up on as needed basis.       Future Appointments   Date Time Provider Department Center   1/16/2025  3:15 PM Amparo Thomson MD [facility] [place]    3/17/2025  2:30 PM Amparo Thomson MD [facility]        Answers submitted by the patient for this visit:  Review of Systems Questionnaire (Submitted on 1/9/2025)  activity change: No  unexpected weight change: No  rhinorrhea: No  trouble swallowing: No  visual disturbance: No  chest tightness: No  polyuria:  No  difficulty urinating: No  menstrual problem: No  joint swelling: No  arthralgias: No  confusion: No  dysphoric mood: No

## 2025-01-22 ENCOUNTER — ON-DEMAND VIRTUAL (OUTPATIENT)
Dept: URGENT CARE | Facility: CLINIC | Age: 45
End: 2025-01-22
Payer: COMMERCIAL

## 2025-01-22 DIAGNOSIS — N89.8 VAGINAL DISCHARGE: Primary | ICD-10-CM

## 2025-01-22 RX ORDER — FLUCONAZOLE 150 MG/1
150 TABLET ORAL DAILY
Qty: 1 TABLET | Refills: 0 | Status: SHIPPED | OUTPATIENT
Start: 2025-01-22 | End: 2025-01-23

## 2025-01-22 NOTE — PROGRESS NOTES
Subjective:      Patient ID: Fadia Thorpe is a 44 y.o. female.    Vitals:  vitals were not taken for this visit.     Chief Complaint: Vaginal Discharge      Visit Type: TELE AUDIOVISUAL    Present with the patient at the time of consultation: TELEMED PRESENT WITH PATIENT: None    Patient Location: Home      Past Medical History:   Diagnosis Date    Attention deficit hyperactivity disorder (ADHD), predominantly inattentive type 05/23/2022    Gastroesophageal reflux disease 05/23/2022    Generalized anxiety disorder 05/23/2022    Major depressive disorder 05/23/2022    Nasal polyp, unspecified 12/2024    Obesity 05/23/2022    Raynaud's disease 05/23/2022    Seasonal allergic rhinitis 05/23/2022     Past Surgical History:   Procedure Laterality Date    abdominal plasty      BREAST BIOPSY  06/04/2019    BREAST SURGERY  12/4/2023    Breast reduction    COSMETIC SURGERY  12/4/2023    Liposuction 360 and abdominoplasty    HYSTERECTOMY  06/13/2019    lyposuction      REDUCTION OF BOTH BREASTS Bilateral      Review of patient's allergies indicates:  No Known Allergies  Current Outpatient Medications on File Prior to Visit   Medication Sig Dispense Refill    albuterol (PROVENTIL) 2.5 mg /3 mL (0.083 %) nebulizer solution Take 2.5 mg by nebulization every 4 (four) hours as needed.      albuterol (VENTOLIN HFA) 90 mcg/actuation inhaler Inhale 2 puffs into the lungs every 6 (six) hours as needed for Wheezing. Rescue 18 g 0    amLODIPine (NORVASC) 5 MG tablet Take 1 tablet (5 mg total) by mouth Daily. 90 tablet 3    atorvastatin (LIPITOR) 20 MG tablet Take 1 tablet (20 mg total) by mouth once daily. 90 tablet 3    azithromycin (Z-CIARA) 250 MG tablet Take 250 mg by mouth once.      clonazePAM (KLONOPIN) 0.5 MG tablet Take 1 tablet (0.5 mg total) by mouth daily as needed for Anxiety. 30 tablet 0    [START ON 3/14/2025] dextroamphetamine-amphetamine 30 mg Tab Take 1 tablet (30 mg total) by mouth every morning. 30 tablet 0     [START ON 2/15/2025] dextroamphetamine-amphetamine 30 mg Tab Take 1 tablet (30 mg total) by mouth every morning. 30 tablet 0    dextroamphetamine-amphetamine 30 mg Tab Take 1 tablet (30 mg total) by mouth every morning. 30 tablet 0    fluticasone propionate (FLONASE) 50 mcg/actuation nasal spray 1 spray (50 mcg total) by Each Nostril route once daily. 16 mL 0    fluvoxaMINE (LUVOX) 100 MG tablet Take 1 tablet (100 mg total) by mouth every evening. 30 tablet 3    guaiFENesin-codeine 100-10 mg/5 ml (TUSSI-ORGANIDIN NR)  mg/5 mL syrup Take 5 mLs by mouth every 6 (six) hours as needed.      levocetirizine (XYZAL) 5 MG tablet Take 1 tablet (5 mg total) by mouth every evening. 30 tablet 0    loratadine (CLARITIN) 10 mg tablet Take 1 tablet (10 mg total) by mouth nightly as needed for Allergies. (Patient not taking: Reported on 12/6/2024) 90 tablet 3    pantoprazole (PROTONIX) 40 MG tablet Take 1 tablet (40 mg total) by mouth once daily. 90 tablet 0    prazosin (MINIPRESS) 1 MG Cap Take 1 mg by mouth 2 (two) times daily.      predniSONE (DELTASONE) 10 MG tablet Take 10 mg by mouth 2 (two) times daily.       No current facility-administered medications on file prior to visit.     Family History   Problem Relation Name Age of Onset    Coronary artery disease Mother Josefa     Heart attack Mother Josefa 40    COPD Mother Josefa     Depression Mother Josefa     Diabetes Mother Josefa     Heart disease Mother Josefa     Hyperlipidemia Mother Josefa     Mental illness Mother Josefa         Bipolar    Coronary artery disease Father Reggis Sr.     COPD Father Reggis Sr.     Heart disease Father Reggis Sr.     Hyperlipidemia Father Reggis Sr.     Hypertension Father Reggis Sr.     Kidney disease Father Reggis Sr.     Alcohol abuse Father Reggis Sr.     Stroke Father Reggis Sr.     Cancer Maternal Aunt Millie     Depression Brother CJ     Mental illness Brother CJ     Drug abuse Brother CJ     Depression Sister Pilar     Depression  Brother Demetri     Diabetes Brother Demetri     Early death Brother Demetri Coleman age 45    Diabetes Brother Messi     Drug abuse Sister Veronica     Alcohol abuse Sister Franchesca     Mental illness Sister Franchesca     Stroke Sister Shelia        Medications Ordered                [facility] - [place] - [address]   [address], [place] [address]    Telephone: [phone]   Fax: [phone]   Hours: Not open 24 hours                         E-Prescribed (1 of 1)              fluconazole (DIFLUCAN) 150 MG Tab    Sig: Take 1 tablet (150 mg total) by mouth once daily. for 1 day       Start: 1/22/25     Quantity: 1 tablet Refills: 0                           Ohs Peq Odvv Intake    1/22/2025  3:34 PM CST - Filed by Patient   What is your current physical address in the event of a medical emergency? [address] , [place], [place]   Are you able to take your vital signs? Yes   Systolic Blood Pressure: 110   Diastolic Blood Pressure: 71   Weight: 180   Height: 61   Pulse: 76   Temperature:    Respiration rate: 16   Pulse Oxygen:    Please attach any relevant images or files    Is your employer contracted with [facility]? No         43 y/o female with c/o thick, white, odorless vaginal discharge x1 week. Occasional itching.         Genitourinary:  Positive for vaginal discharge and painful intercourse. Negative for vaginal pain, vaginal odor and pelvic pain.        Objective:   The physical exam was conducted virtually.  Physical Exam   Constitutional: She is oriented to person, place, and time. She is cooperative. She does not appear ill. No distress. awake  HENT:   Head: Normocephalic.   Abdominal: Normal appearance.   Neurological: She is alert and oriented to person, place, and time.   Psychiatric: Judgment normal.       Assessment:     1. Vaginal discharge        Plan:     Vaginal discharge  -     fluconazole (DIFLUCAN) 150 MG Tab; Take 1 tablet (150 mg total) by mouth once  daily. for 1 day  Dispense: 1 tablet; Refill: 0    Follow-up with Primary Care as discussed for further refills.     Patient encouraged to monitor symptoms closely and instructed to follow-up for new or worsening symptoms. Further, in-person, evaluation may be necessary for continued treatment. Please follow up with your primary care doctor or specialist as needed. Verbally discussed plan. Patient confirms understanding and is in agreement with treatment and plan.      You must understand that you've received a Virtual Care evaluation only and that you may be released before all your medical problems are known or treated. You, the patient, will arrange for follow up care as instructed.

## 2025-02-12 DIAGNOSIS — T78.40XA ALLERGY, INITIAL ENCOUNTER: ICD-10-CM

## 2025-02-12 RX ORDER — FLUTICASONE PROPIONATE 50 MCG
1 SPRAY, SUSPENSION (ML) NASAL DAILY
Qty: 16 ML | Refills: 0 | Status: SHIPPED | OUTPATIENT
Start: 2025-02-12 | End: 2026-02-12

## 2025-03-10 ENCOUNTER — PATIENT MESSAGE (OUTPATIENT)
Dept: FAMILY MEDICINE | Facility: CLINIC | Age: 45
End: 2025-03-10

## 2025-03-10 ENCOUNTER — OFFICE VISIT (OUTPATIENT)
Dept: FAMILY MEDICINE | Facility: CLINIC | Age: 45
End: 2025-03-10
Payer: COMMERCIAL

## 2025-03-10 VITALS — HEIGHT: 61 IN | BODY MASS INDEX: 33.04 KG/M2 | WEIGHT: 175 LBS

## 2025-03-10 DIAGNOSIS — S69.92XA HAND TRAUMA, LEFT, INITIAL ENCOUNTER: ICD-10-CM

## 2025-03-10 PROCEDURE — 98004 SYNCH AUDIO-VIDEO EST SF 10: CPT | Mod: 95,,, | Performed by: STUDENT IN AN ORGANIZED HEALTH CARE EDUCATION/TRAINING PROGRAM

## 2025-03-10 PROCEDURE — 1159F MED LIST DOCD IN RCRD: CPT | Mod: CPTII,95,, | Performed by: STUDENT IN AN ORGANIZED HEALTH CARE EDUCATION/TRAINING PROGRAM

## 2025-03-10 PROCEDURE — 1160F RVW MEDS BY RX/DR IN RCRD: CPT | Mod: CPTII,95,, | Performed by: STUDENT IN AN ORGANIZED HEALTH CARE EDUCATION/TRAINING PROGRAM

## 2025-03-10 NOTE — PROGRESS NOTES
Patient ID: [id number]     Chief Complaint: Fracture finger      HPI:     This is a telemedicine note. Patient was treated using telemedicine, real time audio and video, according to [facility] protocols. I, Amparo Thomson MD, conducted the visit from the [facility]. The patient participated in the visit at a non-[facility] location selected by the patient, identified below. I am licensed in the state where the patient stated they are located. The patient stated that they understood and accepted the privacy and security risks to their information at their location. This visit is not recorded.    Patient was located at the patient's home.       Fadia Thorpe is a 44 y.o. female here today for a telemedicine visit.     History of Present Illness    HPI:  Patient reports injuring her left hand 5th digot smashed in car door on Friday.. She has significant bruising and pain radiating from her fingertip into her wrist and up her arm. An x-ray was taken in the ER, revealing that the distal phalanx was completely fractured and malpositioned. She did not formally check into the ER at the time due to work-related concerns. The injury occurred when she had her hand between the front and back seats of a car, and the door was closed on it. She is seeking further evaluation and treatment for this injury.          -------------------------------------    Attention deficit hyperactivity disorder (ADHD), predominantly inattentive type    Gastroesophageal reflux disease    Generalized anxiety disorder    Major depressive disorder    Nasal polyp, unspecified    Obesity    Raynaud's disease    Seasonal allergic rhinitis        Past Surgical History:   Procedure Laterality Date    abdominal plasty      BREAST BIOPSY  06/04/2019    BREAST SURGERY  12/4/2023    Breast reduction    COSMETIC SURGERY  12/4/2023    Liposuction 360 and abdominoplasty    HYSTERECTOMY  06/13/2019    lyposuction      REDUCTION OF BOTH BREASTS Bilateral         Review of patient's allergies indicates:  No Known Allergies    Current Outpatient Medications   Medication Instructions    albuterol (PROVENTIL) 2.5 mg, Every 4 hours PRN    albuterol (VENTOLIN HFA) 90 mcg/actuation inhaler 2 puffs, Inhalation, Every 6 hours PRN, Rescue    amLODIPine (NORVASC) 5 mg, Oral, Daily    atorvastatin (LIPITOR) 20 mg, Oral, Daily    azithromycin (Z-CIARA) 250 mg, Once    clonazePAM (KLONOPIN) 0.5 mg, Oral, Daily PRN    [START ON 3/14/2025] dextroamphetamine-amphetamine 30 mg Tab 30 mg, Oral, Every morning    dextroamphetamine-amphetamine 30 mg Tab 30 mg, Oral, Every morning    fluticasone propionate (FLONASE) 50 mcg, Each Nostril, Daily    fluvoxaMINE (LUVOX) 100 mg, Oral, Nightly    guaiFENesin-codeine 100-10 mg/5 ml (TUSSI-ORGANIDIN NR)  mg/5 mL syrup 5 mLs, Every 6 hours PRN    levocetirizine (XYZAL) 5 mg, Oral, Nightly    loratadine (CLARITIN) 10 mg, Oral, Nightly PRN    pantoprazole (PROTONIX) 40 mg, Oral, Daily    prazosin (MINIPRESS) 1 mg, 2 times daily    predniSONE (DELTASONE) 10 mg, 2 times daily       Social History[1]     Family History   Problem Relation Name Age of Onset    Coronary artery disease Mother Josefa     Heart attack Mother Josefa 40    COPD Mother Josefa     Depression Mother Josefa     Diabetes Mother Josefa     Heart disease Mother Josefa     Hyperlipidemia Mother Josefa     Mental illness Mother Josefa         Bipolar    Coronary artery disease Father Reggis Sr.     COPD Father Reggis Sr.     Heart disease Father Reggis Sr.     Hyperlipidemia Father Reggis Sr.     Hypertension Father Reggis Sr.     Kidney disease Father Reggis Sr.     Alcohol abuse Father Reggis Sr.     Stroke Father Reggis Sr.     Cancer Maternal Aunt Millie     Depression Brother CJ     Mental illness Brother CJ     Drug abuse Brother CJ     Depression Sister Pilar     Depression Brother Reggis     Diabetes Brother Reggis     Early death Brother Reggis         Covid age 45    Diabetes  "Brother Messi     Drug abuse Sister Veronica     Alcohol abuse Sister Franchesca     Mental illness Sister Franchesca     Stroke Sister Shelia         Patient Care Team:  Amparo Thomson MD as PCP - General (Family Medicine)  Amparo Thomson MD as PCP - Family Medicine (Family Medicine)  Penny Figueroa MD (Obstetrics and Gynecology)  Shelbi Nix LPN as Care Coordinator      Subjective:       ROS    See HPI for details    Constitutional: Denies Change in appetite. Denies Chills. Denies Fever. Denies Night sweats.  Eye: Denies Blurred vision. Denies Discharge. Denies Eye pain.  ENT: Denies Decreased hearing. Denies Sore throat. Denies Swollen glands.  Respiratory: Denies Cough. Denies Shortness of breath. Denies Shortness of breath with exertion. Denies Wheezing.  Cardiovascular: DeniesChest pain at rest. Denies Chest pain with exertion. Denies Irregular heartbeat. Denies Palpitations. Denies Edema.  Gastrointestinal: Denies Abdominal pain. DeniesDiarrhea. Denies Nausea. Denies Vomiting. Denies Hematemesis or Hematochezia.  Genitourinary: Denies Dysuria. Denies Urinary frequency. Denies Urinary urgency. Denies Blood in urine.  Endocrine: Denies Cold intolerance. Denies Excessive thirst. Denies Heat intolerance. Denies Weight loss. Denies Weight gain.  Musculoskeletal: Denies Painful joints. Denies Weakness.  Integumentary: Denies Rash. Denies Itching. Denies Dry skin.  Neurologic: Denies Dizziness. Denies Fainting. Denies Headache.  Psychiatric: Denies Depression. Denies Anxiety. Denies Suicidal/Homicidal ideations.    All Other ROS: Negative except as stated in HPI.       Objective:     Visit Vitals  Ht 5' 1" (1.549 m)   Wt 79.4 kg (175 lb)   BMI 33.07 kg/m²       Physical Exam    Physical Exam: LIMITED DUE TO TELEMEDICINE RESTRICTIONS.  General: Alert and oriented, No acute distress.  Head: Normocephalic, Atraumatic.  Eye: Sclera non-icteric.  Neck/Thyroid:  Full range of motion.  Respiratory: Non-labored " respirations, Symmetrical chest wall expansion.  Musculoskeletal: Normal range of motion.  Integumentary:  No visible suspicious lesions or rashes. No diaphoresis.   Neurologic: No focal deficits  Psychiatric: Normal interaction, Coherent speech, Euthymic mood, Appropriate affect       Assessment:       ICD-10-CM ICD-9-CM   1. Hand trauma, left, initial encounter  S69.92XA 959.4        Plan:     Assessment & Plan          1. Hand trauma, left, initial encounter  -     X-Ray Hand 3 view Left; Future; Expected date: 03/10/2025  -     Ambulatory referral/consult to Orthopedics; Future; Expected date: 03/10/2025     IMPRESSION:  LEFT HAND INJURY:  - RICE therapy   - Ordered stat x-ray of left hand to be completed at [facility] or the patient's preferred facility.  - Referred the patient to Dr. Codey Nails, for urgent evaluation.  - declined for any pain medication  - Er precautions given    Medication List with Changes/Refills   Current Medications    ALBUTEROL (PROVENTIL) 2.5 MG /3 ML (0.083 %) NEBULIZER SOLUTION    Take 2.5 mg by nebulization every 4 (four) hours as needed.       Start Date: 12/3/2024 End Date: --    ALBUTEROL (VENTOLIN HFA) 90 MCG/ACTUATION INHALER    Inhale 2 puffs into the lungs every 6 (six) hours as needed for Wheezing. Rescue       Start Date: 11/6/2024 End Date: 11/6/2025    AMLODIPINE (NORVASC) 5 MG TABLET    Take 1 tablet (5 mg total) by mouth Daily.       Start Date: 10/15/2024End Date: --    ATORVASTATIN (LIPITOR) 20 MG TABLET    Take 1 tablet (20 mg total) by mouth once daily.       Start Date: 10/15/2024End Date: 10/15/2025    AZITHROMYCIN (Z-CIARA) 250 MG TABLET    Take 250 mg by mouth once.       Start Date: 12/4/2024 End Date: --    CLONAZEPAM (KLONOPIN) 0.5 MG TABLET    Take 1 tablet (0.5 mg total) by mouth daily as needed for Anxiety.       Start Date: 1/16/2025 End Date: 2/15/2025    DEXTROAMPHETAMINE-AMPHETAMINE 30 MG TAB    Take 1 tablet (30 mg total) by mouth every morning.        Start Date: 3/14/2025 End Date: 4/13/2025    DEXTROAMPHETAMINE-AMPHETAMINE 30 MG TAB    Take 1 tablet (30 mg total) by mouth every morning.       Start Date: 2/15/2025 End Date: 3/17/2025    FLUTICASONE PROPIONATE (FLONASE) 50 MCG/ACTUATION NASAL SPRAY    1 spray (50 mcg total) by Each Nostril route once daily.       Start Date: 2/12/2025 End Date: 2/12/2026    FLUVOXAMINE (LUVOX) 100 MG TABLET    Take 1 tablet (100 mg total) by mouth every evening.       Start Date: 1/16/2025 End Date: --    GUAIFENESIN-CODEINE 100-10 MG/5 ML (TUSSI-ORGANIDIN NR)  MG/5 ML SYRUP    Take 5 mLs by mouth every 6 (six) hours as needed.       Start Date: 12/3/2024 End Date: --    LEVOCETIRIZINE (XYZAL) 5 MG TABLET    Take 1 tablet (5 mg total) by mouth every evening.       Start Date: 1/13/2025 End Date: 2/12/2025    LORATADINE (CLARITIN) 10 MG TABLET    Take 1 tablet (10 mg total) by mouth nightly as needed for Allergies.       Start Date: 3/12/2024 End Date: --    PANTOPRAZOLE (PROTONIX) 40 MG TABLET    Take 1 tablet (40 mg total) by mouth once daily.       Start Date: 1/16/2025 End Date: 4/16/2025    PRAZOSIN (MINIPRESS) 1 MG CAP    Take 1 mg by mouth 2 (two) times daily.       Start Date: --        End Date: --    PREDNISONE (DELTASONE) 10 MG TABLET    Take 10 mg by mouth 2 (two) times daily.       Start Date: 12/3/2024 End Date: --          Follow up for Keep Scheduled visit. In addition to their scheduled follow up, the patient has also been instructed to follow up on as needed basis.       Future Appointments   Date Time Provider Department Center   3/17/2025  2:15 PM Amparo Thomson MD [facility]     This note was generated with the assistance of ambient listening technology. Verbal consent was obtained by the patient and accompanying visitor(s) for the recording of patient appointment to facilitate this note. I attest to having reviewed and edited the generated note for accuracy, though some syntax or  spelling errors may persist. Please contact the author of this note for any clarification.    Answers submitted by the patient for this visit:  Review of Systems Questionnaire (Submitted on 3/10/2025)  activity change: No  unexpected weight change: No  rhinorrhea: No  trouble swallowing: No  visual disturbance: No  chest tightness: No  polyuria: No  difficulty urinating: No  menstrual problem: No  joint swelling: No  arthralgias: No  confusion: No  dysphoric mood: No         [1]   Social History  Socioeconomic History    Marital status:    Tobacco Use    Smoking status: Never     Passive exposure: Never    Smokeless tobacco: Never   Substance and Sexual Activity    Alcohol use: Yes     Alcohol/week: 10.0 standard drinks of alcohol     Types: 10 Glasses of wine per week     Comment: Socially or to unwind    Drug use: Never    Sexual activity: Yes     Partners: Male     Birth control/protection: See Surgical Hx     Social Drivers of Health     Financial Resource Strain: Medium Risk (3/10/2025)    Overall Financial Resource Strain (CARDIA)     Difficulty of Paying Living Expenses: Somewhat hard   Food Insecurity: No Food Insecurity (3/10/2025)    Hunger Vital Sign     Worried About Running Out of Food in the Last Year: Never true     Ran Out of Food in the Last Year: Never true   Transportation Needs: No Transportation Needs (3/10/2025)    PRAPARE - Transportation     Lack of Transportation (Medical): No     Lack of Transportation (Non-Medical): No   Physical Activity: Sufficiently Active (3/10/2025)    Exercise Vital Sign     Days of Exercise per Week: 3 days     Minutes of Exercise per Session: 150+ min   Stress: No Stress Concern Present (3/10/2025)    [employer] [place] [employer] - Occupational Stress Questionnaire     Feeling of Stress : Only a little   Housing Stability: High Risk (3/10/2025)    Housing Stability Vital Sign     Unable to Pay for Housing in the Last Year: Yes     Number of Times  Moved in the Last Year: 0     Homeless in the Last Year: No

## 2025-03-11 ENCOUNTER — RESULTS FOLLOW-UP (OUTPATIENT)
Dept: FAMILY MEDICINE | Facility: CLINIC | Age: 45
End: 2025-03-11

## 2025-03-11 ENCOUNTER — PATIENT MESSAGE (OUTPATIENT)
Dept: FAMILY MEDICINE | Facility: CLINIC | Age: 45
End: 2025-03-11
Payer: COMMERCIAL

## 2025-03-11 ENCOUNTER — TELEPHONE (OUTPATIENT)
Dept: FAMILY MEDICINE | Facility: CLINIC | Age: 45
End: 2025-03-11
Payer: COMMERCIAL

## 2025-03-11 DIAGNOSIS — S69.92XA INJURY OF LEFT HAND: Primary | ICD-10-CM

## 2025-03-11 NOTE — PROGRESS NOTES
Impression:     Mild soft tissue swelling at the distal little finger with non-displaced fracture the distal tuft of the distal phalanx of the little finger.  Referral sent to Orthopedics Dr. Leo Nails.  Please let me know if you have any difficulty in obtaining appointment

## 2025-03-11 NOTE — TELEPHONE ENCOUNTER
Called pt to confirm their appointment, pt confirmed.     Are there any outstanding tasks in the patients's chart (ex.labs,MM,etc)? Future orders pt does know about them.   Do we have outstanding/pending referrals? no  Has the patient been seen in and ER,UCC, or been admitted since last visit? no  Has the patient seen any other health care provider(doctors) since last visit? no  Has the patient had any bloodwork or x-rays done since last visit? no

## 2025-03-13 ENCOUNTER — OFFICE VISIT (OUTPATIENT)
Facility: CLINIC | Age: 45
End: 2025-03-13
Payer: COMMERCIAL

## 2025-03-13 VITALS
OXYGEN SATURATION: 98 % | SYSTOLIC BLOOD PRESSURE: 126 MMHG | HEART RATE: 98 BPM | DIASTOLIC BLOOD PRESSURE: 85 MMHG | WEIGHT: 192.19 LBS | BODY MASS INDEX: 36.29 KG/M2 | HEIGHT: 61 IN | RESPIRATION RATE: 18 BRPM

## 2025-03-13 DIAGNOSIS — S62.637A CLOSED FRACTURE OF TUFT OF DISTAL PHALANX OF LEFT LITTLE FINGER: Primary | ICD-10-CM

## 2025-03-13 DIAGNOSIS — S69.92XA HAND TRAUMA, LEFT, INITIAL ENCOUNTER: ICD-10-CM

## 2025-03-13 NOTE — Clinical Note
March 13, 2025      [facility]  [address] [address]  Phone: [phone]  Fax: [phone]       Patient: Fadia Thorpe   YOB: 1980  Date of Visit: 03/13/2025    To Whom It May Concern:    TREMAINE Thorpe  was at [facility] on 03/13/2025. The patient may return to work/school on *** {With/no:44082} restrictions. If you have any questions or concerns, or if I can be of further assistance, please do not hesitate to contact me.    Sincerely,    Zak See, NP

## 2025-03-13 NOTE — PROGRESS NOTES
Subjective:      Fadia Thorpe is a 44 y.o. female who presents for evaluation of left small finger injury. She is referred by HAO Thomson MD. She states DOI was on 03/07/25. She states that her small finger was smashed in a car door. She states that she had an artificial nail on and cracked. She had immediate pain in the small finger. She was sent for radiographs on 03/10/25 showing a nondisplaced distal tuft fracture- small finger. She has treated with a splint. She rates her pain as 4/10 and has controlled the pain with Ibuprofen and Tylenol. She is having sensitivity issues with the finger. She states that the pain radiates into the forearm.     Medical History:  Past Medical History:   Diagnosis Date    Attention deficit hyperactivity disorder (ADHD), predominantly inattentive type 05/23/2022    Gastroesophageal reflux disease 05/23/2022    Generalized anxiety disorder 05/23/2022    Major depressive disorder 05/23/2022    Nasal polyp, unspecified 12/2024    Obesity 05/23/2022    Raynaud's disease 05/23/2022    Seasonal allergic rhinitis 05/23/2022       Surgical History:  Past Surgical History:   Procedure Laterality Date    abdominal plasty      BREAST BIOPSY  06/04/2019    BREAST SURGERY  12/4/2023    Breast reduction    COSMETIC SURGERY  12/4/2023    Liposuction 360 and abdominoplasty    HYSTERECTOMY  06/13/2019    lyposuction      REDUCTION OF BOTH BREASTS Bilateral        Family History:  Family History   Problem Relation Name Age of Onset    Coronary artery disease Mother Josefa     Heart attack Mother Josefa 40    COPD Mother Josefa     Depression Mother Josefa     Diabetes Mother Josefa     Heart disease Mother Josefa     Hyperlipidemia Mother Josefa     Mental illness Mother Josefa         Bipolar    Coronary artery disease Father Reggis Sr.     COPD Father Reggis Sr.     Heart disease Father Reggis Sr.     Hyperlipidemia Father Reggis Sr.     Hypertension Father Reggis Sr.     Kidney disease Father Reggis  "Sr.     Alcohol abuse Father Reggis Sr.     Stroke Father Regphoenixs Sr.     Cancer Maternal Aunt Millie     Depression Brother CJ     Mental illness Brother CJ     Drug abuse Brother CJ     Depression Sister Pilar     Depression Brother Kts     Diabetes Brother Kts     Early death Brother Demetri Coleman age 45    Diabetes Brother Messi     Drug abuse Sister Veronica     Alcohol abuse Sister Franchesca     Mental illness Sister Franchesca     Stroke Sister Shelia        Allergies:   Review of patient's allergies indicates:  No Known Allergies      Review of Systems  Constitutional: negative  Musculoskeletal:positive for bone pain  Neurological: negative  Behavioral/Psych: negative      Objective:      /85 (BP Location: Left arm, Patient Position: Sitting)   Pulse 98   Resp 18   Ht 5' 1" (1.549 m)   Wt 87.2 kg (192 lb 3.2 oz)   SpO2 98%   BMI 36.32 kg/m²     Right hand:  normal exam, no swelling, tenderness, instability; ligaments intact, full ROM both hands, wrists, and finger joints   Left hand  (small finger):  soft tissue tenderness and swelling at the DIP joint/distal phalanx and reduced range of motion of small finger.  No bruising of the nail.   AROM limited distal phalanx     Imaging:  X-ray left small finger: Reviewed from 03/10/25   Mild soft tissue swelling at the distal little finger with non-displaced fracture the distal tuft of the distal phalanx of the little finger.     Assessment:     Nondisplaced fracture involving the distal tuft of the distal phalanx of the left little finger.      Plan:     Previous radiographs show a nondisplaced tuft fracture- small finger.   Continue in the finger splint as previous.   Ibuprofen as previous prn pain.   Modified work duties as directed.   RTC 3 weeks for new radiographs.   She had no further questions.    "

## 2025-03-13 NOTE — LETTER
March 13, 2025      [facility]  [address]  [place] [address]  Phone: [phone]  Fax: [phone]       Patient: Fadia Thorpe   YOB: 1980  Date of Visit: 03/13/2025    To Whom It May Concern:    TREMAINE Thorpe  was at [facility] on 03/13/2025. The patient may return to work on modified duties x 3 weeks. She must wear splint as directed. 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 If you have any questions or concerns, or if I can be of further assistance, please do not hesitate to contact me.    Sincerely,    Zak See NP

## 2025-04-02 DIAGNOSIS — S62.637A CLOSED FRACTURE OF TUFT OF DISTAL PHALANX OF LEFT LITTLE FINGER: Primary | ICD-10-CM

## 2025-04-03 ENCOUNTER — OFFICE VISIT (OUTPATIENT)
Facility: CLINIC | Age: 45
End: 2025-04-03
Payer: COMMERCIAL

## 2025-04-03 VITALS
WEIGHT: 189.38 LBS | HEART RATE: 87 BPM | SYSTOLIC BLOOD PRESSURE: 122 MMHG | OXYGEN SATURATION: 97 % | BODY MASS INDEX: 35.75 KG/M2 | DIASTOLIC BLOOD PRESSURE: 88 MMHG | HEIGHT: 61 IN | RESPIRATION RATE: 18 BRPM

## 2025-04-03 DIAGNOSIS — S62.637A CLOSED FRACTURE OF TUFT OF DISTAL PHALANX OF LEFT LITTLE FINGER: Primary | ICD-10-CM

## 2025-04-03 PROCEDURE — 3079F DIAST BP 80-89 MM HG: CPT | Mod: CPTII,,, | Performed by: NURSE PRACTITIONER

## 2025-04-03 PROCEDURE — 1159F MED LIST DOCD IN RCRD: CPT | Mod: CPTII,,, | Performed by: NURSE PRACTITIONER

## 2025-04-03 PROCEDURE — 3074F SYST BP LT 130 MM HG: CPT | Mod: CPTII,,, | Performed by: NURSE PRACTITIONER

## 2025-04-03 PROCEDURE — 99213 OFFICE O/P EST LOW 20 MIN: CPT | Mod: ,,, | Performed by: NURSE PRACTITIONER

## 2025-04-03 PROCEDURE — 3008F BODY MASS INDEX DOCD: CPT | Mod: CPTII,,, | Performed by: NURSE PRACTITIONER

## 2025-04-03 NOTE — LETTER
April 3, 2025      [facility]  [address] [address]  Phone: [phone]  Fax: [phone]       Patient: Fadia Thorpe   YOB: 1980  Date of Visit: 04/03/2025    To Whom It May Concern:    TREMAINE Thorpe  was at [facility] on 04/03/2025. The patient may return to work on 04/04/25. If you have any questions or concerns, or if I can be of further assistance, please do not hesitate to contact me.    Sincerely,    Zak See NP

## 2025-04-03 NOTE — PROGRESS NOTES
Subjective:      Follow up: Left small finger injury    Fadia Thorpe is a 44 y.o. female who presents for follow up for a left small finger injury. DOI was on 03/07/25. Previous radiographs showed a nondisplaced distal tuft fracture- small finger. She has treated with a splint. She rates her pain as 02/10 and has controlled the pain with Ibuprofen and Tylenol. She is having decreased sensitivity issues with the finger.     Medical History:       Past Medical History:   Diagnosis Date    Attention deficit hyperactivity disorder (ADHD), predominantly inattentive type 05/23/2022    Gastroesophageal reflux disease 05/23/2022    Generalized anxiety disorder 05/23/2022    Major depressive disorder 05/23/2022    Nasal polyp, unspecified 12/2024    Obesity 05/23/2022    Raynaud's disease 05/23/2022    Seasonal allergic rhinitis 05/23/2022         Surgical History:        Past Surgical History:   Procedure Laterality Date    abdominal plasty        BREAST BIOPSY   06/04/2019    BREAST SURGERY   12/4/2023     Breast reduction    COSMETIC SURGERY   12/4/2023     Liposuction 360 and abdominoplasty    HYSTERECTOMY   06/13/2019    lyposuction        REDUCTION OF BOTH BREASTS Bilateral           Family History:         Family History   Problem Relation Name Age of Onset    Coronary artery disease Mother Josefa      Heart attack Mother Josefa 40    COPD Mother Josefa      Depression Mother Josefa      Diabetes Mother Josefa      Heart disease Mother Josefa      Hyperlipidemia Mother Josefa      Mental illness Mother Josefa           Bipolar    Coronary artery disease Father Reggis Sr.      COPD Father Reggis Sr.      Heart disease Father Reggis Sr.      Hyperlipidemia Father Reggis Sr.      Hypertension Father Reggis Sr.      Kidney disease Father Reggis Sr.      Alcohol abuse Father Reggis Sr.      Stroke Father Reggis Sr.      Cancer Maternal Aunt Millie      Depression Brother CJ      Mental illness Brother CJ      Drug abuse  Brother CJ      Depression Sister Pilar      Depression Brother Demetri      Diabetes Brother Demetri      Early death Brother Demetri Coleman age 45    Diabetes Brother Messi      Drug abuse Sister Veronica      Alcohol abuse Sister Franchesca      Mental illness Sister Franchesca      Stroke Sister Shelia        Allergies:   Review of patient's allergies indicates:  No Known Allergies     Review of Systems  Constitutional: negative  Musculoskeletal:positive for bone pain  Neurological: negative  Behavioral/Psych: negative     Objective:      Right hand:  normal exam, no swelling, tenderness, instability; ligaments intact, full ROM both hands, wrists, and finger joints   Left hand  (small finger):  Soft tissue tenderness and swelling at the DIP  has decreased  Range of motion of small finger DIP has improved.  No bruising of the nail.       Imaging:  X-ray left small finger: Ordered and reviewed from today  Transverse fracture is again seen at the distal phalangeal tuft, with the fracture line diastatic by approximately 1 mm.  No changes of healing are appreciated.  There is no radiopaque foreign body.     Impression:     Left hand 5th digit distal phalangeal tuft fracture.  No interval changes of healing.     Assessment:      Nondisplaced fracture involving the distal tuft of the distal phalanx of the left little finger.      Plan:      She is noted with clinical improvement.   Continue in the finger splint as previous.   Ibuprofen as previous prn pain.   Work excuse given. May work with a splint.  RTC 3 weeks for new radiographs.   She had no further questions.

## 2025-04-07 ENCOUNTER — RESULTS FOLLOW-UP (OUTPATIENT)
Dept: FAMILY MEDICINE | Facility: CLINIC | Age: 45
End: 2025-04-07

## 2025-04-30 DIAGNOSIS — S62.637A CLOSED FRACTURE OF TUFT OF DISTAL PHALANX OF LEFT LITTLE FINGER: Primary | ICD-10-CM

## 2025-05-01 ENCOUNTER — OFFICE VISIT (OUTPATIENT)
Facility: CLINIC | Age: 45
End: 2025-05-01
Payer: COMMERCIAL

## 2025-05-01 ENCOUNTER — LAB VISIT (OUTPATIENT)
Dept: LAB | Facility: HOSPITAL | Age: 45
End: 2025-05-01
Attending: STUDENT IN AN ORGANIZED HEALTH CARE EDUCATION/TRAINING PROGRAM
Payer: COMMERCIAL

## 2025-05-01 ENCOUNTER — RESULTS FOLLOW-UP (OUTPATIENT)
Dept: FAMILY MEDICINE | Facility: CLINIC | Age: 45
End: 2025-05-01

## 2025-05-01 VITALS
SYSTOLIC BLOOD PRESSURE: 137 MMHG | WEIGHT: 192 LBS | BODY MASS INDEX: 36.25 KG/M2 | DIASTOLIC BLOOD PRESSURE: 85 MMHG | HEART RATE: 75 BPM | HEIGHT: 61 IN | OXYGEN SATURATION: 97 % | RESPIRATION RATE: 18 BRPM

## 2025-05-01 DIAGNOSIS — Z00.00 WELL ADULT HEALTH CHECK: ICD-10-CM

## 2025-05-01 DIAGNOSIS — F90.0 ADHD, PREDOMINANTLY INATTENTIVE TYPE: Primary | ICD-10-CM

## 2025-05-01 DIAGNOSIS — S62.637A CLOSED FRACTURE OF TUFT OF DISTAL PHALANX OF LEFT LITTLE FINGER: Primary | ICD-10-CM

## 2025-05-01 DIAGNOSIS — Z00.00 WELLNESS EXAMINATION: ICD-10-CM

## 2025-05-01 LAB
ACCEPTIBLE SP GR UR QL: 1.02 (ref 1–1.03)
ALBUMIN SERPL-MCNC: 3.6 G/DL (ref 3.5–5)
ALBUMIN/GLOB SERPL: 1 RATIO (ref 1.1–2)
ALP SERPL-CCNC: 83 UNIT/L (ref 40–150)
ALT SERPL-CCNC: 12 UNIT/L (ref 0–55)
AMPHET UR QL SCN: NEGATIVE
ANION GAP SERPL CALC-SCNC: 7 MEQ/L
AST SERPL-CCNC: 12 UNIT/L (ref 11–45)
BACTERIA #/AREA URNS AUTO: ABNORMAL /HPF
BARBITURATE SCN PRESENT UR: NEGATIVE
BASOPHILS # BLD AUTO: 0.02 X10(3)/MCL
BASOPHILS NFR BLD AUTO: 0.3 %
BENZODIAZ UR QL SCN: NEGATIVE
BILIRUB SERPL-MCNC: 0.4 MG/DL
BILIRUB UR QL STRIP.AUTO: NEGATIVE
BUN SERPL-MCNC: 7.4 MG/DL (ref 7–18.7)
CALCIUM SERPL-MCNC: 9 MG/DL (ref 8.4–10.2)
CANNABINOIDS UR QL SCN: NEGATIVE
CHLORIDE SERPL-SCNC: 109 MMOL/L (ref 98–107)
CHOLEST SERPL-MCNC: 187 MG/DL
CHOLEST/HDLC SERPL: 3 {RATIO} (ref 0–5)
CLARITY UR: CLEAR
CO2 SERPL-SCNC: 24 MMOL/L (ref 22–29)
COCAINE UR QL SCN: NEGATIVE
COLOR UR AUTO: ABNORMAL
CREAT SERPL-MCNC: 0.69 MG/DL (ref 0.55–1.02)
CREAT UR-MCNC: 83.7 MG/DL (ref 45–106)
CREAT/UREA NIT SERPL: 11
EOSINOPHIL # BLD AUTO: 0.02 X10(3)/MCL (ref 0–0.9)
EOSINOPHIL NFR BLD AUTO: 0.3 %
ERYTHROCYTE [DISTWIDTH] IN BLOOD BY AUTOMATED COUNT: 12.6 % (ref 11.5–17)
EST. AVERAGE GLUCOSE BLD GHB EST-MCNC: 108.3 MG/DL
FENTANYL UR QL SCN: NEGATIVE
GFR SERPLBLD CREATININE-BSD FMLA CKD-EPI: >60 ML/MIN/1.73/M2
GLOBULIN SER-MCNC: 3.6 GM/DL (ref 2.4–3.5)
GLUCOSE SERPL-MCNC: 94 MG/DL (ref 74–100)
GLUCOSE UR QL STRIP: NORMAL
HBA1C MFR BLD: 5.4 %
HCT VFR BLD AUTO: 41.3 % (ref 37–47)
HDLC SERPL-MCNC: 56 MG/DL (ref 35–60)
HGB BLD-MCNC: 13.2 G/DL (ref 12–16)
HGB UR QL STRIP: NEGATIVE
IMM GRANULOCYTES # BLD AUTO: 0.02 X10(3)/MCL (ref 0–0.04)
IMM GRANULOCYTES NFR BLD AUTO: 0.3 %
KETONES UR QL STRIP: NEGATIVE
LDLC SERPL CALC-MCNC: 114 MG/DL (ref 50–140)
LEUKOCYTE ESTERASE UR QL STRIP: NEGATIVE
LYMPHOCYTES # BLD AUTO: 2.24 X10(3)/MCL (ref 0.6–4.6)
LYMPHOCYTES NFR BLD AUTO: 31.7 %
MCH RBC QN AUTO: 29.6 PG (ref 27–31)
MCHC RBC AUTO-ENTMCNC: 32 G/DL (ref 33–36)
MCV RBC AUTO: 92.6 FL (ref 80–94)
MDMA UR QL SCN: NEGATIVE
MICROALBUMIN UR-MCNC: 12.4 UG/ML
MICROALBUMIN/CREAT RATIO PNL UR: 14.8 MG/GM CR (ref 0–30)
MONOCYTES # BLD AUTO: 0.39 X10(3)/MCL (ref 0.1–1.3)
MONOCYTES NFR BLD AUTO: 5.5 %
MUCOUS THREADS URNS QL MICRO: ABNORMAL /LPF
NEUTROPHILS # BLD AUTO: 4.37 X10(3)/MCL (ref 2.1–9.2)
NEUTROPHILS NFR BLD AUTO: 61.9 %
NITRITE UR QL STRIP: NEGATIVE
NRBC BLD AUTO-RTO: 0 %
OPIATES UR QL SCN: NEGATIVE
PCP UR QL: NEGATIVE
PH UR STRIP: 5 [PH]
PH UR: 5 [PH] (ref 3–11)
PLATELET # BLD AUTO: 318 X10(3)/MCL (ref 130–400)
PMV BLD AUTO: 8.9 FL (ref 7.4–10.4)
POTASSIUM SERPL-SCNC: 4.6 MMOL/L (ref 3.5–5.1)
PROT SERPL-MCNC: 7.2 GM/DL (ref 6.4–8.3)
PROT UR QL STRIP: NEGATIVE
RBC # BLD AUTO: 4.46 X10(6)/MCL (ref 4.2–5.4)
RBC #/AREA URNS AUTO: ABNORMAL /HPF
SODIUM SERPL-SCNC: 140 MMOL/L (ref 136–145)
SP GR UR STRIP.AUTO: 1.02 (ref 1–1.03)
SQUAMOUS #/AREA URNS LPF: ABNORMAL /HPF
TRIGL SERPL-MCNC: 85 MG/DL (ref 37–140)
TSH SERPL-ACNC: 3.08 UIU/ML (ref 0.35–4.94)
UROBILINOGEN UR STRIP-ACNC: NORMAL
VLDLC SERPL CALC-MCNC: 17 MG/DL
WBC # BLD AUTO: 7.06 X10(3)/MCL (ref 4.5–11.5)
WBC #/AREA URNS AUTO: ABNORMAL /HPF

## 2025-05-01 PROCEDURE — 36415 COLL VENOUS BLD VENIPUNCTURE: CPT

## 2025-05-01 PROCEDURE — 99213 OFFICE O/P EST LOW 20 MIN: CPT | Mod: ,,, | Performed by: NURSE PRACTITIONER

## 2025-05-01 PROCEDURE — 1159F MED LIST DOCD IN RCRD: CPT | Mod: CPTII,,, | Performed by: NURSE PRACTITIONER

## 2025-05-01 PROCEDURE — 80061 LIPID PANEL: CPT

## 2025-05-01 PROCEDURE — 80053 COMPREHEN METABOLIC PANEL: CPT

## 2025-05-01 PROCEDURE — 80307 DRUG TEST PRSMV CHEM ANLYZR: CPT

## 2025-05-01 PROCEDURE — 81015 MICROSCOPIC EXAM OF URINE: CPT

## 2025-05-01 PROCEDURE — 1160F RVW MEDS BY RX/DR IN RCRD: CPT | Mod: CPTII,,, | Performed by: NURSE PRACTITIONER

## 2025-05-01 PROCEDURE — 3079F DIAST BP 80-89 MM HG: CPT | Mod: CPTII,,, | Performed by: NURSE PRACTITIONER

## 2025-05-01 PROCEDURE — 3008F BODY MASS INDEX DOCD: CPT | Mod: CPTII,,, | Performed by: NURSE PRACTITIONER

## 2025-05-01 PROCEDURE — 3075F SYST BP GE 130 - 139MM HG: CPT | Mod: CPTII,,, | Performed by: NURSE PRACTITIONER

## 2025-05-01 PROCEDURE — 82043 UR ALBUMIN QUANTITATIVE: CPT

## 2025-05-01 PROCEDURE — 84443 ASSAY THYROID STIM HORMONE: CPT

## 2025-05-01 PROCEDURE — 83036 HEMOGLOBIN GLYCOSYLATED A1C: CPT

## 2025-05-01 PROCEDURE — 85025 COMPLETE CBC W/AUTO DIFF WBC: CPT

## 2025-05-01 NOTE — LETTER
May 1, 2025      [facility]  [address] [address]  Phone: [phone]  Fax: [phone]       Patient: Fadia Thorpe   YOB: 1980  Date of Visit: 05/01/2025    To Whom It May Concern:    TREMAINE Thorpe  was at [facility] on 05/01/2025. The patient may return to work with no restrictions. If you have any questions or concerns, or if I can be of further assistance, please do not hesitate to contact me.    Sincerely,    Zak See, NP

## 2025-05-01 NOTE — PROGRESS NOTES
Subjective:      Follow up: Left small finger injury     Fadia Thorpe is a 44 y.o. female who presents for follow up for a left small finger injury. DOI was on 03/07/25. Previous radiographs showed a nondisplaced distal tuft fracture- small finger. She presents and states that she has no pain and has regained normal ROM. She is no longer using the splint. She rates her pain as 0/10. She reports improved sensation.      Medical History:          Past Medical History:   Diagnosis Date    Attention deficit hyperactivity disorder (ADHD), predominantly inattentive type 05/23/2022    Gastroesophageal reflux disease 05/23/2022    Generalized anxiety disorder 05/23/2022    Major depressive disorder 05/23/2022    Nasal polyp, unspecified 12/2024    Obesity 05/23/2022    Raynaud's disease 05/23/2022    Seasonal allergic rhinitis 05/23/2022         Surgical History:            Past Surgical History:   Procedure Laterality Date    abdominal plasty        BREAST BIOPSY   06/04/2019    BREAST SURGERY   12/4/2023     Breast reduction    COSMETIC SURGERY   12/4/2023     Liposuction 360 and abdominoplasty    HYSTERECTOMY   06/13/2019    lyposuction        REDUCTION OF BOTH BREASTS Bilateral           Family History:              Family History   Problem Relation Name Age of Onset    Coronary artery disease Mother Josefa      Heart attack Mother Josefa 40    COPD Mother Josefa      Depression Mother Josefa      Diabetes Mother Josefa      Heart disease Mother Josefa      Hyperlipidemia Mother Josefa      Mental illness Mother Josefa           Bipolar    Coronary artery disease Father Reggis Sr.      COPD Father Reggis Sr.      Heart disease Father Reggis Sr.      Hyperlipidemia Father Reggis Sr.      Hypertension Father Reggis Sr.      Kidney disease Father Reggis Sr.      Alcohol abuse Father Reggis Sr.      Stroke Father Reggis Sr.      Cancer Maternal Aunt Millie      Depression Brother CJ      Mental illness Brother CJ      Drug  abuse Brother CJ      Depression Sister Pilar      Depression Brother Demetri      Diabetes Brother Demetri      Early death Brother Demetri Coleman age 45    Diabetes Brother Messi      Drug abuse Sister Veronica      Alcohol abuse Sister Franchesca      Mental illness Sister Franchesca      Stroke Sister Shelia        Allergies:   Review of patient's allergies indicates:  No Known Allergies     Review of Systems  Constitutional: negative  Musculoskeletal:positive for bone pain  Neurological: negative  Behavioral/Psych: negative     Objective:      Right hand:  normal exam, no swelling, tenderness, instability; ligaments intact, full ROM both hands, wrists, and finger joints   Left hand  (small finger):  Soft tissue tenderness and swelling at the DIP has resolved.  Range of motion of small finger DIP has improved.  No bruising of the nail.       Imaging:  X-ray left small finger: Ordered and reviewed from today:  Subacute healing fracture at the distal tuft of the distal phalanx of the little finger with no significant displacement or new abnormality since radiographs 04/03/2025.     Assessment:      Nondisplaced fracture involving the distal tuft of the distal phalanx of the left little finger.      Plan:      She is noted with further clinical improvement.   Continue ROM as directed.    Ibuprofen as previous prn pain.   Work excuse given. Release- no restrictions.   RTC prn.   She had no further questions.

## 2025-05-02 ENCOUNTER — TELEPHONE (OUTPATIENT)
Dept: FAMILY MEDICINE | Facility: CLINIC | Age: 45
End: 2025-05-02
Payer: COMMERCIAL

## 2025-05-07 ENCOUNTER — OFFICE VISIT (OUTPATIENT)
Dept: FAMILY MEDICINE | Facility: CLINIC | Age: 45
End: 2025-05-07
Payer: COMMERCIAL

## 2025-05-07 VITALS
WEIGHT: 193.88 LBS | BODY MASS INDEX: 36.6 KG/M2 | DIASTOLIC BLOOD PRESSURE: 86 MMHG | RESPIRATION RATE: 18 BRPM | HEART RATE: 92 BPM | TEMPERATURE: 98 F | OXYGEN SATURATION: 99 % | HEIGHT: 61 IN | SYSTOLIC BLOOD PRESSURE: 130 MMHG

## 2025-05-07 DIAGNOSIS — E66.01 SEVERE OBESITY (BMI 35.0-39.9) WITH COMORBIDITY: ICD-10-CM

## 2025-05-07 DIAGNOSIS — Z12.31 BREAST CANCER SCREENING BY MAMMOGRAM: ICD-10-CM

## 2025-05-07 DIAGNOSIS — F33.0 MILD EPISODE OF RECURRENT MAJOR DEPRESSIVE DISORDER: Chronic | ICD-10-CM

## 2025-05-07 DIAGNOSIS — E78.2 MIXED HYPERLIPIDEMIA: ICD-10-CM

## 2025-05-07 DIAGNOSIS — Z00.00 WELLNESS EXAMINATION: ICD-10-CM

## 2025-05-07 DIAGNOSIS — Z00.00 WELL ADULT HEALTH CHECK: ICD-10-CM

## 2025-05-07 DIAGNOSIS — N95.9 PREMENOPAUSAL PATIENT: ICD-10-CM

## 2025-05-07 DIAGNOSIS — F41.1 GENERALIZED ANXIETY DISORDER: Chronic | ICD-10-CM

## 2025-05-07 DIAGNOSIS — I73.00 RAYNAUD'S DISEASE WITHOUT GANGRENE: Chronic | ICD-10-CM

## 2025-05-07 DIAGNOSIS — F90.2 ATTENTION DEFICIT HYPERACTIVITY DISORDER (ADHD), COMBINED TYPE: ICD-10-CM

## 2025-05-07 RX ORDER — AMLODIPINE BESYLATE 5 MG/1
5 TABLET ORAL DAILY
Qty: 90 TABLET | Refills: 3 | Status: SHIPPED | OUTPATIENT
Start: 2025-05-07

## 2025-05-07 RX ORDER — DEXTROAMPHETAMINE SACCHARATE, AMPHETAMINE ASPARTATE, DEXTROAMPHETAMINE SULFATE AND AMPHETAMINE SULFATE 7.5; 7.5; 7.5; 7.5 MG/1; MG/1; MG/1; MG/1
30 TABLET ORAL EVERY MORNING
Qty: 30 TABLET | Refills: 0 | Status: SHIPPED | OUTPATIENT
Start: 2025-05-07 | End: 2025-06-06

## 2025-05-07 RX ORDER — ATORVASTATIN CALCIUM 20 MG/1
20 TABLET, FILM COATED ORAL DAILY
Qty: 90 TABLET | Refills: 3 | Status: SHIPPED | OUTPATIENT
Start: 2025-05-07 | End: 2026-05-07

## 2025-05-07 RX ORDER — TIRZEPATIDE 2.5 MG/.5ML
2.5 INJECTION, SOLUTION SUBCUTANEOUS
Qty: 2 ML | Refills: 0 | Status: SHIPPED | OUTPATIENT
Start: 2025-05-07 | End: 2025-06-06

## 2025-05-07 RX ORDER — DEXTROAMPHETAMINE SACCHARATE, AMPHETAMINE ASPARTATE, DEXTROAMPHETAMINE SULFATE AND AMPHETAMINE SULFATE 2.5; 2.5; 2.5; 2.5 MG/1; MG/1; MG/1; MG/1
10 TABLET ORAL DAILY
Qty: 30 TABLET | Refills: 0 | Status: SHIPPED | OUTPATIENT
Start: 2025-05-07

## 2025-05-07 NOTE — PROGRESS NOTES
Family Medicine    Patient ID: [id number]     Chief Complaint: Annual Exam      HPI:     Fadia Thorpe is a 44 y.o. female here today for an annual wellness visit.    History of Present Illness             Overall patient is doing good.  She has some concerns.    Reports that she has been emotional and has gained weight after surgery.  She is status post hysterectomy.  Has been having hot flashes.  Interested in starting Zep bound.  Would like to increase the dose of Adderall  The 10-year ASCVD risk score (Peña COLON, et al., 2019) is: 1.1%    Values used to calculate the score:      Age: 44 years      Sex: Female      Is Non- : No      Diabetic: No      Tobacco smoker: No      Systolic Blood Pressure: 130 mmHg      Is BP treated: Yes      HDL Cholesterol: 56 mg/dL      Total Cholesterol: 187 mg/dL     Past Medical History:   Diagnosis Date    Attention deficit hyperactivity disorder (ADHD), predominantly inattentive type 05/23/2022    Gastroesophageal reflux disease 05/23/2022    Generalized anxiety disorder 05/23/2022    Major depressive disorder 05/23/2022    Nasal polyp, unspecified 12/2024    Obesity 05/23/2022    Raynaud's disease 05/23/2022    Seasonal allergic rhinitis 05/23/2022        Past Surgical History:   Procedure Laterality Date    abdominal plasty      BREAST BIOPSY  06/04/2019    BREAST SURGERY  12/4/2023    Breast reduction    COSMETIC SURGERY  12/4/2023    Liposuction 360 and abdominoplasty    HYSTERECTOMY  06/13/2019    lyposuction      REDUCTION OF BOTH BREASTS Bilateral         Social History     Tobacco Use    Smoking status: Never     Passive exposure: Never    Smokeless tobacco: Never   Substance and Sexual Activity    Alcohol use: Yes     Alcohol/week: 10.0 standard drinks of alcohol     Types: 10 Glasses of wine per week     Comment: Socially or to unwind    Drug use: Never    Sexual activity: Yes     Partners: Male     Birth control/protection: See Surgical Hx     "    Current Outpatient Medications   Medication Instructions    albuterol (PROVENTIL) 2.5 mg, Every 4 hours PRN    albuterol (VENTOLIN HFA) 90 mcg/actuation inhaler 2 puffs, Inhalation, Every 6 hours PRN, Rescue    amLODIPine (NORVASC) 5 mg, Oral, Daily    atorvastatin (LIPITOR) 20 mg, Oral, Daily    clonazePAM (KLONOPIN) 0.5 mg, Oral, Daily PRN    dextroamphetamine-amphetamine 30 mg Tab 30 mg, Oral, Every morning    fluticasone propionate (FLONASE) 50 mcg, Each Nostril, Daily    fluvoxaMINE (LUVOX) 100 mg, Oral, Nightly    levocetirizine (XYZAL) 5 mg, Oral, Nightly    pantoprazole (PROTONIX) 40 mg, Oral, Daily    prazosin (MINIPRESS) 1 mg, 2 times daily       Review of patient's allergies indicates:  No Known Allergies     Patient Care Team:  Amparo Thomson MD as PCP - General (Family Medicine)  Amparo Thomson MD as PCP - Family Medicine (Family Medicine)  Penny Figueroa MD (Obstetrics and Gynecology)  Shelbi Nix LPN as Care Coordinator     Subjective:     Review of Systems    12 point review of systems conducted, negative except as stated in the history of present illness. See HPI for details.    Objective:     Visit Vitals  /86 (BP Location: Left arm, Patient Position: Sitting)   Pulse 92   Temp 98 °F (36.7 °C) (Oral)   Resp 18   Ht 5' 1" (1.549 m)   Wt 88 kg (193 lb 14.4 oz)   SpO2 99%   BMI 36.64 kg/m²       Physical Exam    General: Alert and oriented, obese No acute distress.  Respiratory: Clear to auscultation bilaterally; No wheezes, rales or rhonchi,Non-labored respirations, Symmetrical chest wall expansion.  Cardiovascular: Regular rate and rhythm, S1/S2 normal, No murmurs, rubs or gallops.  Gastrointestinal: Soft, Non-tender, Non-distended, Normal bowel sounds, No palpable organomegaly.  Labs Reviewed:     Chemistry:  Lab Results   Component Value Date     05/01/2025    K 4.6 05/01/2025    BUN 7.4 05/01/2025    CREATININE 0.69 05/01/2025    EGFRNORACEVR >60 05/01/2025    " CALCIUM 9.0 05/01/2025    ALKPHOS 83 05/01/2025    ALBUMIN 3.6 05/01/2025    BILIDIR 0.10 11/06/2017    IBILI 0.30 11/06/2017    AST 12 05/01/2025    ALT 12 05/01/2025    MG 2.00 12/09/2023    TSH 3.083 05/01/2025        Lab Results   Component Value Date    HGBA1C 5.4 05/01/2025        Hematology:  Lab Results   Component Value Date    WBC 7.06 05/01/2025    HGB 13.2 05/01/2025    HCT 41.3 05/01/2025     05/01/2025       Lipid Panel:  Lab Results   Component Value Date    CHOL 187 05/01/2025    HDL 56 05/01/2025    .00 05/01/2025    TRIG 85 05/01/2025    TOTALCHOLEST 3 05/01/2025        Urine:  Lab Results   Component Value Date    APPEARANCEUA Clear 05/01/2025    SGUA 1.018 05/01/2025    PROTEINUA Negative 05/01/2025    KETONESUA Negative 05/01/2025    LEUKOCYTESUR Negative 05/01/2025    RBCUA 0-5 05/01/2025    WBCUA 0-5 05/01/2025    BACTERIA None Seen 05/01/2025    SQEPUA Trace 05/01/2025    CREATRANDUR 83.7 05/01/2025        Assessment:       ICD-10-CM ICD-9-CM   1. Wellness examination  Z00.00 V70.0   2. Attention deficit hyperactivity disorder (ADHD), predominantly inattentive type  F90.0 314.00   3. Premenopausal patient  N95.9 627.2   4. Generalized anxiety disorder  F41.1 300.02   5. Mild episode of recurrent major depressive disorder  F33.0 296.31   6. Breast cancer screening by mammogram  Z12.31 V76.12   7. Attention deficit hyperactivity disorder (ADHD), combined type  F90.2 314.01   8. Raynaud's disease without gangrene  I73.00 443.0   9. Severe obesity (BMI 35.0-39.9) with comorbidity  E66.01 278.01   10. Mixed hyperlipidemia  E78.2 272.2        Plan:       Vaccinations -   Immunization History   Administered Date(s) Administered    Influenza - Trivalent - Fluarix, Flulaval, Fluzone, Afluria - PF 12/23/2014, 10/25/2017, 09/10/2018, 09/24/2019, 10/13/2020, 10/14/2021    MMR 11/09/2017    Tdap 08/09/2017          Assessment & Plan               1. Wellness examination  Cervical Cancer  Screening - S/p hysterectomy  Breast Cancer Screening - Last Mammogram on Normal. Follow up in 1 year  Colon Cancer Screening - [place] guard negative, next due in 2027  Eye Exam - Recommend annually.  Dental Exam - Recommend biannual exams.      Diet discussed (healthy food choices, reduce portions and overall calorie intake)  Exercise 30-45 minutes 5x per week  Avoid excessive alcohol and tobacco if taking  Immunizations dicussed  Monthly breast exam recommended  Preventative exams discussed.      2. Attention deficit hyperactivity disorder (ADHD), predominantly inattentive type  7. Attention deficit hyperactivity disorder (ADHD), combined type  -     dextroamphetamine-amphetamine 30 mg Tab; Take 1 tablet (30 mg total) by mouth every morning.  Dispense: 30 tablet; Refill: 0  -     dextroamphetamine-amphetamine (ADDERALL) 10 mg Tab; Take 1 tablet (10 mg total) by mouth once daily.  Dispense: 30 tablet; Refill: 0  Increase 10 mg of Adderall and continue 30 mg of Adderall   -Continue medication regimen as prescribed  -Rx refilled with appropriate fill dates  - reviewed  -Scheduled drug contract reviewed  -Medication side effect profile discussed at length  -if experiencing any shortness a breath, chest pain, palpitations, dizziness, loss of consciousness does not discontinue the medication and report to ER    3. Premenopausal patient    4. Generalized anxiety disorder  5. Mild episode of recurrent major depressive disorder  Can switch to paroxetine for hot flashes   Patient may consider in future   Continue fluoxetine as of now    6. Breast cancer screening by mammogram  -     Mammo Digital Screening Bilat w/ Pratik (XPD); Future; Expected date: 05/07/2025    8. Raynaud's disease without gangrene  -     amLODIPine (NORVASC) 5 MG tablet; Take 1 tablet (5 mg total) by mouth Daily.  Dispense: 90 tablet; Refill: 3    9. Severe obesity (BMI 35.0-39.9) with comorbidity  Body mass index is 36.64 kg/m².  Goal BMI  <30.  Exercise 5 times a week for 30 minutes per day.  Avoid soda, simple sugars, excessive rice, potatoes or bread. Limit fast foods and fried foods.  Choose complex carbs in moderation (example: green vegetables, beans, oatmeal). Eat plenty of fresh fruits and vegetables with lean meats daily.  Do not skip meals. Eat a balanced portion size.  Avoid fad diets. Consider permanent healthy life style changes.   Generic Name: Tirzepatide   Brand Name: Zepbound  MOA- works by stimulating both the GIP and GLP-1 receptors. It increases insulin secretion, decreases glucagon secretion, slows gastric emptying, and reduces appetite.   Contraindications: Medullary thyroid carcinoma (MTC) or in patients with Multiple Endocrine Neoplasia syndrome type 2 (MEN 2), pregnancy and breastfeeding   Precautions with Use: Caution in patients with a history of pancreatitis.   Female patient only : May decrease absorption of oral contraceptive pills and back up method to prevent pregnancy should be used after starting, with every dose increase and for 4 weeks after each dose increase.     Above conditions discussed with the patient in detail.  Patient voiced understanding    10. Mixed hyperlipidemia  -     atorvastatin (LIPITOR) 20 MG tablet; Take 1 tablet (20 mg total) by mouth once daily.  Dispense: 90 tablet; Refill: 3    11. Well adult health check  -     CBC Auto Differential; Future; Expected date: 05/07/2026  -     Comprehensive Metabolic Panel; Future; Expected date: 05/07/2026  -     Lipid Panel; Future; Expected date: 05/07/2026  -     TSH; Future; Expected date: 05/07/2026  -     Hemoglobin A1C; Future; Expected date: 05/07/2026  -     Urinalysis; Future; Expected date: 05/07/2026  -     Microalbumin/Creatinine Ratio, Urine; Future; Expected date: 05/07/2026  -     T4, Free; Future; Expected date: 05/07/2026  -     Uric Acid; Future; Expected date: 05/07/2026  -     Vitamin B12; Future; Expected date: 05/07/2026  -     Folate;  Future; Expected date: 05/07/2026         Follow up in about 3 months (around 8/7/2025) for Virtual Visit, ADD Follow Up. 1AW. In addition to their scheduled follow up, the patient has also been instructed to follow up on as needed basis.     This note was generated with the assistance of ambient listening technology. Verbal consent was obtained by the patient and accompanying visitor(s) for the recording of patient appointment to facilitate this note. I attest to having reviewed and edited the generated note for accuracy, though some syntax or spelling errors may persist. Please contact the author of this note for any clarification.    No future appointments.     Amparo Thomson MD

## 2025-05-10 DIAGNOSIS — F41.1 GENERALIZED ANXIETY DISORDER: ICD-10-CM

## 2025-05-12 RX ORDER — CLONAZEPAM 0.5 MG/1
0.5 TABLET ORAL DAILY PRN
Qty: 30 TABLET | Refills: 0 | Status: SHIPPED | OUTPATIENT
Start: 2025-05-12 | End: 2025-06-11

## 2025-07-21 DIAGNOSIS — K21.9 GASTROESOPHAGEAL REFLUX DISEASE WITHOUT ESOPHAGITIS: ICD-10-CM

## 2025-07-21 RX ORDER — PANTOPRAZOLE SODIUM 40 MG/1
40 TABLET, DELAYED RELEASE ORAL DAILY
Qty: 90 TABLET | Refills: 0 | Status: SHIPPED | OUTPATIENT
Start: 2025-07-21 | End: 2025-10-19